# Patient Record
Sex: FEMALE | Race: BLACK OR AFRICAN AMERICAN | NOT HISPANIC OR LATINO | Employment: OTHER | URBAN - METROPOLITAN AREA
[De-identification: names, ages, dates, MRNs, and addresses within clinical notes are randomized per-mention and may not be internally consistent; named-entity substitution may affect disease eponyms.]

---

## 2017-04-11 LAB — CRC RECOMMENDATION EXT: NORMAL

## 2017-12-07 ENCOUNTER — HISTORICAL (OUTPATIENT)
Dept: ADMINISTRATIVE | Facility: HOSPITAL | Age: 72
End: 2017-12-07

## 2017-12-07 LAB
ABS NEUT (OLG): 3.03 X10(3)/MCL (ref 2.1–9.2)
ALBUMIN SERPL-MCNC: 3.7 GM/DL (ref 3.4–5)
ALBUMIN/GLOB SERPL: 1.2 {RATIO}
ALP SERPL-CCNC: 100 UNIT/L (ref 38–126)
ALT SERPL-CCNC: 14 UNIT/L (ref 12–78)
AST SERPL-CCNC: 16 UNIT/L (ref 15–37)
BASOPHILS # BLD AUTO: 0 X10(3)/MCL (ref 0–0.2)
BASOPHILS NFR BLD AUTO: 1 %
BILIRUB SERPL-MCNC: 0.5 MG/DL (ref 0.2–1)
BILIRUBIN DIRECT+TOT PNL SERPL-MCNC: 0.1 MG/DL (ref 0–0.2)
BILIRUBIN DIRECT+TOT PNL SERPL-MCNC: 0.4 MG/DL (ref 0–0.8)
BUN SERPL-MCNC: 20 MG/DL (ref 7–18)
CALCIUM SERPL-MCNC: 8.6 MG/DL (ref 8.5–10.1)
CHLORIDE SERPL-SCNC: 106 MMOL/L (ref 98–107)
CHOLEST SERPL-MCNC: 233 MG/DL (ref 0–200)
CHOLEST/HDLC SERPL: 2.7 {RATIO} (ref 0–4)
CO2 SERPL-SCNC: 28 MMOL/L (ref 21–32)
CREAT SERPL-MCNC: 0.69 MG/DL (ref 0.55–1.02)
EOSINOPHIL # BLD AUTO: 0.1 X10(3)/MCL (ref 0–0.9)
EOSINOPHIL NFR BLD AUTO: 2 %
ERYTHROCYTE [DISTWIDTH] IN BLOOD BY AUTOMATED COUNT: 13 % (ref 11.5–17)
GLOBULIN SER-MCNC: 3 GM/DL (ref 2.4–3.5)
GLUCOSE SERPL-MCNC: 78 MG/DL (ref 74–106)
HCT VFR BLD AUTO: 34.8 % (ref 37–47)
HDLC SERPL-MCNC: 86 MG/DL (ref 35–60)
HGB BLD-MCNC: 10.9 GM/DL (ref 12–16)
LDLC SERPL CALC-MCNC: 138 MG/DL (ref 0–129)
LYMPHOCYTES # BLD AUTO: 2 X10(3)/MCL (ref 0.6–4.6)
LYMPHOCYTES NFR BLD AUTO: 35 %
MCH RBC QN AUTO: 28.8 PG (ref 27–31)
MCHC RBC AUTO-ENTMCNC: 31.3 GM/DL (ref 33–36)
MCV RBC AUTO: 92.1 FL (ref 80–94)
MONOCYTES # BLD AUTO: 0.4 X10(3)/MCL (ref 0.1–1.3)
MONOCYTES NFR BLD AUTO: 7 %
NEUTROPHILS # BLD AUTO: 3.03 X10(3)/MCL (ref 1.4–7.9)
NEUTROPHILS NFR BLD AUTO: 54 %
PLATELET # BLD AUTO: 216 X10(3)/MCL (ref 130–400)
PMV BLD AUTO: 9.6 FL (ref 9.4–12.4)
POTASSIUM SERPL-SCNC: 4.7 MMOL/L (ref 3.5–5.1)
PROT SERPL-MCNC: 6.7 GM/DL (ref 6.4–8.2)
RBC # BLD AUTO: 3.78 X10(6)/MCL (ref 4.2–5.4)
SODIUM SERPL-SCNC: 141 MMOL/L (ref 136–145)
T4 FREE SERPL-MCNC: 1.04 NG/DL (ref 0.76–1.46)
TRIGL SERPL-MCNC: 45 MG/DL (ref 30–150)
TSH SERPL-ACNC: 2.27 MIU/ML (ref 0.36–3.74)
VLDLC SERPL CALC-MCNC: 9 MG/DL
WBC # SPEC AUTO: 5.6 X10(3)/MCL (ref 4.5–11.5)

## 2019-01-04 ENCOUNTER — HISTORICAL (OUTPATIENT)
Dept: ADMINISTRATIVE | Facility: HOSPITAL | Age: 74
End: 2019-01-04

## 2019-01-04 LAB
ABS NEUT (OLG): 6.01 X10(3)/MCL (ref 2.1–9.2)
ALBUMIN SERPL-MCNC: 3.8 GM/DL (ref 3.4–5)
ALP SERPL-CCNC: 87 UNIT/L (ref 38–126)
ALT SERPL-CCNC: 13 UNIT/L (ref 12–78)
AST SERPL-CCNC: 15 UNIT/L (ref 15–37)
BASOPHILS # BLD AUTO: 0 X10(3)/MCL (ref 0–0.2)
BASOPHILS NFR BLD AUTO: 0 %
BILIRUB SERPL-MCNC: 0.5 MG/DL (ref 0.2–1)
BILIRUBIN DIRECT+TOT PNL SERPL-MCNC: 0.1 MG/DL (ref 0–0.5)
BILIRUBIN DIRECT+TOT PNL SERPL-MCNC: 0.4 MG/DL (ref 0–0.8)
BUN SERPL-MCNC: 26 MG/DL (ref 7–18)
CALCIUM SERPL-MCNC: 8.8 MG/DL (ref 8.5–10.1)
CHLORIDE SERPL-SCNC: 103 MMOL/L (ref 98–107)
CHOLEST SERPL-MCNC: 264 MG/DL (ref 0–200)
CHOLEST/HDLC SERPL: 2.5 {RATIO} (ref 0–4)
CO2 SERPL-SCNC: 28 MMOL/L (ref 21–32)
CREAT SERPL-MCNC: 0.82 MG/DL (ref 0.55–1.02)
CREAT/UREA NIT SERPL: 31.7
EOSINOPHIL # BLD AUTO: 0 X10(3)/MCL (ref 0–0.9)
EOSINOPHIL NFR BLD AUTO: 0 %
ERYTHROCYTE [DISTWIDTH] IN BLOOD BY AUTOMATED COUNT: 13.2 % (ref 11.5–17)
GLUCOSE SERPL-MCNC: 79 MG/DL (ref 74–106)
HCT VFR BLD AUTO: 38.3 % (ref 37–47)
HDLC SERPL-MCNC: 106 MG/DL (ref 35–60)
HGB BLD-MCNC: 12 GM/DL (ref 12–16)
LDLC SERPL CALC-MCNC: 143 MG/DL (ref 0–129)
LIVER PROFILE INTERP: NORMAL
LYMPHOCYTES # BLD AUTO: 4.3 X10(3)/MCL (ref 0.6–4.6)
LYMPHOCYTES NFR BLD AUTO: 38 %
MCH RBC QN AUTO: 29 PG (ref 27–31)
MCHC RBC AUTO-ENTMCNC: 31.3 GM/DL (ref 33–36)
MCV RBC AUTO: 92.5 FL (ref 80–94)
MONOCYTES # BLD AUTO: 0.8 X10(3)/MCL (ref 0.1–1.3)
MONOCYTES NFR BLD AUTO: 7 %
NEUTROPHILS # BLD AUTO: 6.01 X10(3)/MCL (ref 2.1–9.2)
NEUTROPHILS NFR BLD AUTO: 53 %
PLATELET # BLD AUTO: 282 X10(3)/MCL (ref 130–400)
PMV BLD AUTO: 9 FL (ref 9.4–12.4)
POTASSIUM SERPL-SCNC: 4 MMOL/L (ref 3.5–5.1)
PROT SERPL-MCNC: 7.3 GM/DL (ref 6.4–8.2)
RBC # BLD AUTO: 4.14 X10(6)/MCL (ref 4.2–5.4)
SODIUM SERPL-SCNC: 139 MMOL/L (ref 136–145)
TRIGL SERPL-MCNC: 73 MG/DL (ref 30–150)
TSH SERPL-ACNC: 3.12 MIU/L (ref 0.36–3.74)
VLDLC SERPL CALC-MCNC: 15 MG/DL
WBC # SPEC AUTO: 11.3 X10(3)/MCL (ref 4.5–11.5)

## 2019-03-19 ENCOUNTER — HOSPITAL ENCOUNTER (OUTPATIENT)
Dept: NUTRITION | Facility: HOSPITAL | Age: 74
End: 2019-03-20
Attending: INTERNAL MEDICINE | Admitting: INTERNAL MEDICINE

## 2019-03-19 LAB
ABS NEUT (OLG): 5.17 X10(3)/MCL (ref 2.1–9.2)
ALBUMIN SERPL-MCNC: 3.5 GM/DL (ref 3.4–5)
ALBUMIN/GLOB SERPL: 1 RATIO (ref 1.1–2)
ALP SERPL-CCNC: 73 UNIT/L (ref 38–126)
ALT SERPL-CCNC: 14 UNIT/L (ref 12–78)
AST SERPL-CCNC: 20 UNIT/L (ref 15–37)
BASOPHILS # BLD AUTO: 0 X10(3)/MCL (ref 0–0.2)
BASOPHILS NFR BLD AUTO: 0 %
BILIRUB SERPL-MCNC: 0.4 MG/DL (ref 0.2–1)
BILIRUBIN DIRECT+TOT PNL SERPL-MCNC: 0.1 MG/DL (ref 0–0.5)
BILIRUBIN DIRECT+TOT PNL SERPL-MCNC: 0.3 MG/DL (ref 0–0.8)
BUN SERPL-MCNC: 21 MG/DL (ref 7–18)
CALCIUM SERPL-MCNC: 8.3 MG/DL (ref 8.5–10.1)
CHLORIDE SERPL-SCNC: 105 MMOL/L (ref 98–107)
CK SERPL-CCNC: 120 UNIT/L (ref 26–192)
CO2 SERPL-SCNC: 29 MMOL/L (ref 21–32)
CREAT SERPL-MCNC: 0.78 MG/DL (ref 0.55–1.02)
EOSINOPHIL # BLD AUTO: 0.3 X10(3)/MCL (ref 0–0.9)
EOSINOPHIL NFR BLD AUTO: 4 %
ERYTHROCYTE [DISTWIDTH] IN BLOOD BY AUTOMATED COUNT: 13.5 % (ref 11.5–17)
GLOBULIN SER-MCNC: 3.5 GM/DL (ref 2.4–3.5)
GLUCOSE SERPL-MCNC: 99 MG/DL (ref 74–106)
HCT VFR BLD AUTO: 35.4 % (ref 37–47)
HGB BLD-MCNC: 10.9 GM/DL (ref 12–16)
LYMPHOCYTES # BLD AUTO: 2.1 X10(3)/MCL (ref 0.6–4.6)
LYMPHOCYTES NFR BLD AUTO: 26 %
MCH RBC QN AUTO: 28.7 PG (ref 27–31)
MCHC RBC AUTO-ENTMCNC: 30.8 GM/DL (ref 33–36)
MCV RBC AUTO: 93.2 FL (ref 80–94)
MONOCYTES # BLD AUTO: 0.6 X10(3)/MCL (ref 0.1–1.3)
MONOCYTES NFR BLD AUTO: 7 %
NEUTROPHILS # BLD AUTO: 5.17 X10(3)/MCL (ref 2.1–9.2)
NEUTROPHILS NFR BLD AUTO: 63 %
PLATELET # BLD AUTO: 223 X10(3)/MCL (ref 130–400)
PMV BLD AUTO: 9.6 FL (ref 9.4–12.4)
POTASSIUM SERPL-SCNC: 4 MMOL/L (ref 3.5–5.1)
PROT SERPL-MCNC: 7 GM/DL (ref 6.4–8.2)
RBC # BLD AUTO: 3.8 X10(6)/MCL (ref 4.2–5.4)
SODIUM SERPL-SCNC: 139 MMOL/L (ref 136–145)
TROPONIN I SERPL-MCNC: 0.21 NG/ML (ref 0.02–0.49)
WBC # SPEC AUTO: 8.2 X10(3)/MCL (ref 4.5–11.5)

## 2019-03-20 LAB
CK MB SERPL-MCNC: 2.4 NG/ML (ref 0.5–3.6)
CK SERPL-CCNC: 119 UNIT/L (ref 26–192)
TROPONIN I SERPL-MCNC: 0.16 NG/ML (ref 0.02–0.49)
TROPONIN I SERPL-MCNC: 0.34 NG/ML (ref 0.02–0.49)

## 2019-04-22 ENCOUNTER — HISTORICAL (OUTPATIENT)
Dept: ADMINISTRATIVE | Facility: HOSPITAL | Age: 74
End: 2019-04-22

## 2019-04-22 LAB
ABS NEUT (OLG): 3.08 X10(3)/MCL (ref 2.1–9.2)
ALBUMIN SERPL-MCNC: 3.9 GM/DL (ref 3.4–5)
ALBUMIN/GLOB SERPL: 1.3 RATIO (ref 1.1–2)
ALP SERPL-CCNC: 78 UNIT/L (ref 38–126)
ALT SERPL-CCNC: 17 UNIT/L (ref 12–78)
APPEARANCE, UA: CLEAR
AST SERPL-CCNC: 20 UNIT/L (ref 15–37)
BACTERIA SPEC CULT: ABNORMAL /HPF
BASOPHILS # BLD AUTO: 0 X10(3)/MCL (ref 0–0.2)
BASOPHILS NFR BLD AUTO: 1 %
BILIRUB SERPL-MCNC: 0.4 MG/DL (ref 0.2–1)
BILIRUB UR QL STRIP: NEGATIVE
BILIRUBIN DIRECT+TOT PNL SERPL-MCNC: 0.1 MG/DL (ref 0–0.5)
BILIRUBIN DIRECT+TOT PNL SERPL-MCNC: 0.3 MG/DL (ref 0–0.8)
BUN SERPL-MCNC: 16 MG/DL (ref 7–18)
CALCIUM SERPL-MCNC: 8.9 MG/DL (ref 8.5–10.1)
CHLORIDE SERPL-SCNC: 105 MMOL/L (ref 98–107)
CHOLEST SERPL-MCNC: 226 MG/DL (ref 0–200)
CHOLEST/HDLC SERPL: 2.7 {RATIO} (ref 0–4)
CO2 SERPL-SCNC: 28 MMOL/L (ref 21–32)
COLOR UR: YELLOW
CREAT SERPL-MCNC: 0.82 MG/DL (ref 0.55–1.02)
EOSINOPHIL # BLD AUTO: 0.2 X10(3)/MCL (ref 0–0.9)
EOSINOPHIL NFR BLD AUTO: 3 %
ERYTHROCYTE [DISTWIDTH] IN BLOOD BY AUTOMATED COUNT: 13.2 % (ref 11.5–17)
GLOBULIN SER-MCNC: 2.9 GM/DL (ref 2.4–3.5)
GLUCOSE (UA): NEGATIVE
GLUCOSE SERPL-MCNC: 86 MG/DL (ref 74–106)
HCT VFR BLD AUTO: 35.2 % (ref 37–47)
HDLC SERPL-MCNC: 85 MG/DL (ref 35–60)
HGB BLD-MCNC: 10.8 GM/DL (ref 12–16)
HGB UR QL STRIP: NEGATIVE
KETONES UR QL STRIP: NEGATIVE
LDLC SERPL CALC-MCNC: 126 MG/DL (ref 0–129)
LEUKOCYTE ESTERASE UR QL STRIP: ABNORMAL
LYMPHOCYTES # BLD AUTO: 2.6 X10(3)/MCL (ref 0.6–4.6)
LYMPHOCYTES NFR BLD AUTO: 40 %
MAGNESIUM SERPL-MCNC: 2.2 MG/DL (ref 1.8–2.4)
MCH RBC QN AUTO: 29.6 PG (ref 27–31)
MCHC RBC AUTO-ENTMCNC: 30.7 GM/DL (ref 33–36)
MCV RBC AUTO: 96.4 FL (ref 80–94)
MONOCYTES # BLD AUTO: 0.5 X10(3)/MCL (ref 0.1–1.3)
MONOCYTES NFR BLD AUTO: 8 %
NEUTROPHILS # BLD AUTO: 3.08 X10(3)/MCL (ref 2.1–9.2)
NEUTROPHILS NFR BLD AUTO: 48 %
NITRITE UR QL STRIP: NEGATIVE
PH UR STRIP: 7 [PH] (ref 5–9)
PLATELET # BLD AUTO: 235 X10(3)/MCL (ref 130–400)
PMV BLD AUTO: 9.5 FL (ref 9.4–12.4)
POTASSIUM SERPL-SCNC: 4.3 MMOL/L (ref 3.5–5.1)
PROT SERPL-MCNC: 6.8 GM/DL (ref 6.4–8.2)
PROT UR QL STRIP: NEGATIVE
RBC # BLD AUTO: 3.65 X10(6)/MCL (ref 4.2–5.4)
RBC #/AREA URNS HPF: ABNORMAL /[HPF]
SODIUM SERPL-SCNC: 140 MMOL/L (ref 136–145)
SP GR UR STRIP: 1.01 (ref 1–1.03)
SQUAMOUS EPITHELIAL, UA: ABNORMAL
TRIGL SERPL-MCNC: 75 MG/DL (ref 30–150)
UROBILINOGEN UR STRIP-ACNC: 0.2
VLDLC SERPL CALC-MCNC: 15 MG/DL
WBC # SPEC AUTO: 6.4 X10(3)/MCL (ref 4.5–11.5)
WBC #/AREA URNS HPF: ABNORMAL /[HPF]

## 2019-07-24 ENCOUNTER — HISTORICAL (OUTPATIENT)
Dept: ADMINISTRATIVE | Facility: HOSPITAL | Age: 74
End: 2019-07-24

## 2019-08-05 ENCOUNTER — HISTORICAL (OUTPATIENT)
Dept: ADMINISTRATIVE | Facility: HOSPITAL | Age: 74
End: 2019-08-05

## 2019-11-13 ENCOUNTER — HISTORICAL (OUTPATIENT)
Dept: ADMINISTRATIVE | Facility: HOSPITAL | Age: 74
End: 2019-11-13

## 2019-11-19 ENCOUNTER — HISTORICAL (OUTPATIENT)
Dept: RADIOLOGY | Facility: HOSPITAL | Age: 74
End: 2019-11-19

## 2020-01-29 ENCOUNTER — HISTORICAL (OUTPATIENT)
Dept: ADMINISTRATIVE | Facility: HOSPITAL | Age: 75
End: 2020-01-29

## 2020-01-29 LAB
ABS NEUT (OLG): 3.56 X10(3)/MCL (ref 2.1–9.2)
ALBUMIN SERPL-MCNC: 3.8 GM/DL (ref 3.4–5)
ALBUMIN/GLOB SERPL: 1.3 {RATIO}
ALP SERPL-CCNC: 73 UNIT/L (ref 38–126)
ALT SERPL-CCNC: 19 UNIT/L (ref 12–78)
AST SERPL-CCNC: 27 UNIT/L (ref 15–37)
BASOPHILS # BLD AUTO: 0 X10(3)/MCL (ref 0–0.2)
BASOPHILS NFR BLD AUTO: 1 %
BILIRUB SERPL-MCNC: 0.4 MG/DL (ref 0.2–1)
BILIRUBIN DIRECT+TOT PNL SERPL-MCNC: 0.1 MG/DL (ref 0–0.2)
BILIRUBIN DIRECT+TOT PNL SERPL-MCNC: 0.3 MG/DL (ref 0–0.8)
BUN SERPL-MCNC: 23 MG/DL (ref 7–18)
CALCIUM SERPL-MCNC: 8.8 MG/DL (ref 8.5–10.1)
CHLORIDE SERPL-SCNC: 107 MMOL/L (ref 98–107)
CHOLEST SERPL-MCNC: 251 MG/DL (ref 0–200)
CHOLEST/HDLC SERPL: 2.5 {RATIO} (ref 0–4)
CK SERPL-CCNC: 177 UNIT/L (ref 26–192)
CO2 SERPL-SCNC: 29 MMOL/L (ref 21–32)
CREAT SERPL-MCNC: 0.78 MG/DL (ref 0.55–1.02)
EOSINOPHIL # BLD AUTO: 0.2 X10(3)/MCL (ref 0–0.9)
EOSINOPHIL NFR BLD AUTO: 4 %
ERYTHROCYTE [DISTWIDTH] IN BLOOD BY AUTOMATED COUNT: 13.9 % (ref 11.5–17)
GLOBULIN SER-MCNC: 2.9 GM/DL (ref 2.4–3.5)
GLUCOSE SERPL-MCNC: 88 MG/DL (ref 74–106)
HCT VFR BLD AUTO: 35.3 % (ref 37–47)
HDLC SERPL-MCNC: 99 MG/DL (ref 35–60)
HGB BLD-MCNC: 10.7 GM/DL (ref 12–16)
LDLC SERPL CALC-MCNC: 140 MG/DL (ref 0–129)
LYMPHOCYTES # BLD AUTO: 2.2 X10(3)/MCL (ref 0.6–4.6)
LYMPHOCYTES NFR BLD AUTO: 33 %
MCH RBC QN AUTO: 28.7 PG (ref 27–31)
MCHC RBC AUTO-ENTMCNC: 30.3 GM/DL (ref 33–36)
MCV RBC AUTO: 94.6 FL (ref 80–94)
MONOCYTES # BLD AUTO: 0.6 X10(3)/MCL (ref 0.1–1.3)
MONOCYTES NFR BLD AUTO: 8 %
NEUTROPHILS # BLD AUTO: 3.56 X10(3)/MCL (ref 2.1–9.2)
NEUTROPHILS NFR BLD AUTO: 53 %
PLATELET # BLD AUTO: 229 X10(3)/MCL (ref 130–400)
PMV BLD AUTO: 9.6 FL (ref 9.4–12.4)
POTASSIUM SERPL-SCNC: 4.4 MMOL/L (ref 3.5–5.1)
PROT SERPL-MCNC: 6.7 GM/DL (ref 6.4–8.2)
RBC # BLD AUTO: 3.73 X10(6)/MCL (ref 4.2–5.4)
SODIUM SERPL-SCNC: 141 MMOL/L (ref 136–145)
TRIGL SERPL-MCNC: 61 MG/DL (ref 30–150)
TSH SERPL-ACNC: 2.8 MIU/L (ref 0.36–3.74)
VLDLC SERPL CALC-MCNC: 12 MG/DL
WBC # SPEC AUTO: 6.7 X10(3)/MCL (ref 4.5–11.5)

## 2020-06-23 ENCOUNTER — HISTORICAL (OUTPATIENT)
Dept: ADMINISTRATIVE | Facility: HOSPITAL | Age: 75
End: 2020-06-23

## 2020-06-23 LAB
ABS NEUT (OLG): 3.36 X10(3)/MCL (ref 2.1–9.2)
ALBUMIN SERPL-MCNC: 3.9 GM/DL (ref 3.4–5)
ALBUMIN/GLOB SERPL: 1.5 RATIO (ref 1.1–2)
ALP SERPL-CCNC: 64 UNIT/L (ref 40–150)
ALT SERPL-CCNC: 6 UNIT/L (ref 0–55)
APPEARANCE, UA: CLEAR
AST SERPL-CCNC: 18 UNIT/L (ref 5–34)
BACTERIA SPEC CULT: ABNORMAL /HPF
BASOPHILS # BLD AUTO: 0 X10(3)/MCL (ref 0–0.2)
BASOPHILS NFR BLD AUTO: 1 %
BILIRUB SERPL-MCNC: 0.7 MG/DL
BILIRUB UR QL STRIP: NEGATIVE
BILIRUBIN DIRECT+TOT PNL SERPL-MCNC: 0.2 MG/DL (ref 0–0.5)
BILIRUBIN DIRECT+TOT PNL SERPL-MCNC: 0.5 MG/DL (ref 0–0.8)
BUN SERPL-MCNC: 17.1 MG/DL (ref 9.8–20.1)
CALCIUM SERPL-MCNC: 8.6 MG/DL (ref 8.4–10.2)
CHLORIDE SERPL-SCNC: 107 MMOL/L (ref 98–107)
CHOLEST SERPL-MCNC: 223 MG/DL
CHOLEST/HDLC SERPL: 3 {RATIO} (ref 0–5)
CO2 SERPL-SCNC: 28 MMOL/L (ref 23–31)
COLOR UR: YELLOW
CREAT SERPL-MCNC: 0.77 MG/DL (ref 0.55–1.02)
EOSINOPHIL # BLD AUTO: 0.2 X10(3)/MCL (ref 0–0.9)
EOSINOPHIL NFR BLD AUTO: 2 %
ERYTHROCYTE [DISTWIDTH] IN BLOOD BY AUTOMATED COUNT: 13.4 % (ref 11.5–17)
GLOBULIN SER-MCNC: 2.6 GM/DL (ref 2.4–3.5)
GLUCOSE (UA): NEGATIVE
GLUCOSE SERPL-MCNC: 87 MG/DL (ref 82–115)
HCT VFR BLD AUTO: 34.8 % (ref 37–47)
HDLC SERPL-MCNC: 73 MG/DL (ref 35–60)
HGB BLD-MCNC: 10.8 GM/DL (ref 12–16)
HGB UR QL STRIP: NEGATIVE
KETONES UR QL STRIP: NEGATIVE
LDLC SERPL CALC-MCNC: 141 MG/DL (ref 50–140)
LEUKOCYTE ESTERASE UR QL STRIP: ABNORMAL
LYMPHOCYTES # BLD AUTO: 2.1 X10(3)/MCL (ref 0.6–4.6)
LYMPHOCYTES NFR BLD AUTO: 34 %
MCH RBC QN AUTO: 29.6 PG (ref 27–31)
MCHC RBC AUTO-ENTMCNC: 31 GM/DL (ref 33–36)
MCV RBC AUTO: 95.3 FL (ref 80–94)
MONOCYTES # BLD AUTO: 0.4 X10(3)/MCL (ref 0.1–1.3)
MONOCYTES NFR BLD AUTO: 7 %
NEUTROPHILS # BLD AUTO: 3.36 X10(3)/MCL (ref 2.1–9.2)
NEUTROPHILS NFR BLD AUTO: 55 %
NITRITE UR QL STRIP: NEGATIVE
PH UR STRIP: 7 [PH] (ref 5–9)
PLATELET # BLD AUTO: 223 X10(3)/MCL (ref 130–400)
PMV BLD AUTO: 9.4 FL (ref 9.4–12.4)
POTASSIUM SERPL-SCNC: 4.8 MMOL/L (ref 3.5–5.1)
PROT SERPL-MCNC: 6.5 GM/DL (ref 5.8–7.6)
PROT UR QL STRIP: NEGATIVE
RBC # BLD AUTO: 3.65 X10(6)/MCL (ref 4.2–5.4)
RBC #/AREA URNS HPF: ABNORMAL /[HPF]
SODIUM SERPL-SCNC: 142 MMOL/L (ref 136–145)
SP GR UR STRIP: 1.01 (ref 1–1.03)
SQUAMOUS EPITHELIAL, UA: ABNORMAL
TRIGL SERPL-MCNC: 43 MG/DL (ref 37–140)
UROBILINOGEN UR STRIP-ACNC: 0.2
VLDLC SERPL CALC-MCNC: 9 MG/DL
WBC # SPEC AUTO: 6.1 X10(3)/MCL (ref 4.5–11.5)
WBC #/AREA URNS HPF: ABNORMAL /[HPF]

## 2020-09-29 LAB — BMD RECOMMENDATION EXT: NORMAL

## 2020-10-12 ENCOUNTER — HISTORICAL (OUTPATIENT)
Dept: ADMINISTRATIVE | Facility: HOSPITAL | Age: 75
End: 2020-10-12

## 2020-10-12 LAB
ABS NEUT (OLG): 2.9 X10(3)/MCL (ref 2.1–9.2)
ALBUMIN SERPL-MCNC: 4.2 GM/DL (ref 3.4–4.8)
ALBUMIN/GLOB SERPL: 1.8 RATIO (ref 1.1–2)
ALP SERPL-CCNC: 57 UNIT/L (ref 40–150)
ALT SERPL-CCNC: 9 UNIT/L (ref 0–55)
APPEARANCE, UA: CLEAR
AST SERPL-CCNC: 22 UNIT/L (ref 5–34)
BACTERIA SPEC CULT: ABNORMAL /HPF
BASOPHILS # BLD AUTO: 0 X10(3)/MCL (ref 0–0.2)
BASOPHILS NFR BLD AUTO: 1 %
BILIRUB SERPL-MCNC: 0.5 MG/DL
BILIRUB UR QL STRIP: NEGATIVE
BILIRUBIN DIRECT+TOT PNL SERPL-MCNC: 0.2 MG/DL (ref 0–0.5)
BILIRUBIN DIRECT+TOT PNL SERPL-MCNC: 0.3 MG/DL (ref 0–0.8)
BUN SERPL-MCNC: 16.5 MG/DL (ref 9.8–20.1)
CALCIUM SERPL-MCNC: 8.8 MG/DL (ref 8.4–10.2)
CHLORIDE SERPL-SCNC: 105 MMOL/L (ref 98–107)
CHOLEST SERPL-MCNC: 144 MG/DL
CHOLEST/HDLC SERPL: 2 {RATIO} (ref 0–5)
CO2 SERPL-SCNC: 28 MMOL/L (ref 23–31)
COLOR UR: YELLOW
CREAT SERPL-MCNC: 0.81 MG/DL (ref 0.55–1.02)
EOSINOPHIL # BLD AUTO: 0.1 X10(3)/MCL (ref 0–0.9)
EOSINOPHIL NFR BLD AUTO: 2 %
ERYTHROCYTE [DISTWIDTH] IN BLOOD BY AUTOMATED COUNT: 12.8 % (ref 11.5–17)
GLOBULIN SER-MCNC: 2.3 GM/DL (ref 2.4–3.5)
GLUCOSE (UA): NEGATIVE
GLUCOSE SERPL-MCNC: 85 MG/DL (ref 82–115)
HCT VFR BLD AUTO: 33.5 % (ref 37–47)
HDLC SERPL-MCNC: 73 MG/DL (ref 35–60)
HGB BLD-MCNC: 10.6 GM/DL (ref 12–16)
HGB UR QL STRIP: NEGATIVE
KETONES UR QL STRIP: NEGATIVE
LDLC SERPL CALC-MCNC: 65 MG/DL (ref 50–140)
LEUKOCYTE ESTERASE UR QL STRIP: ABNORMAL
LYMPHOCYTES # BLD AUTO: 2.2 X10(3)/MCL (ref 0.6–4.6)
LYMPHOCYTES NFR BLD AUTO: 39 %
MCH RBC QN AUTO: 29.2 PG (ref 27–31)
MCHC RBC AUTO-ENTMCNC: 31.6 GM/DL (ref 33–36)
MCV RBC AUTO: 92.3 FL (ref 80–94)
MONOCYTES # BLD AUTO: 0.4 X10(3)/MCL (ref 0.1–1.3)
MONOCYTES NFR BLD AUTO: 7 %
NEUTROPHILS # BLD AUTO: 2.9 X10(3)/MCL (ref 2.1–9.2)
NEUTROPHILS NFR BLD AUTO: 51 %
NITRITE UR QL STRIP: NEGATIVE
PH UR STRIP: 6.5 [PH] (ref 5–9)
PLATELET # BLD AUTO: 200 X10(3)/MCL (ref 130–400)
PMV BLD AUTO: 10.4 FL (ref 9.4–12.4)
POTASSIUM SERPL-SCNC: 4.9 MMOL/L (ref 3.5–5.1)
PROT SERPL-MCNC: 6.5 GM/DL (ref 5.8–7.6)
PROT UR QL STRIP: NEGATIVE
RBC # BLD AUTO: 3.63 X10(6)/MCL (ref 4.2–5.4)
RBC #/AREA URNS HPF: ABNORMAL /[HPF]
SODIUM SERPL-SCNC: 141 MMOL/L (ref 136–145)
SP GR UR STRIP: 1.01 (ref 1–1.03)
SQUAMOUS EPITHELIAL, UA: ABNORMAL
TRIGL SERPL-MCNC: 28 MG/DL (ref 37–140)
UROBILINOGEN UR STRIP-ACNC: 0.2
VLDLC SERPL CALC-MCNC: 6 MG/DL
WBC # SPEC AUTO: 5.7 X10(3)/MCL (ref 4.5–11.5)
WBC #/AREA URNS HPF: 1 /HPF (ref 0–3)

## 2021-01-06 ENCOUNTER — HISTORICAL (OUTPATIENT)
Dept: ADMINISTRATIVE | Facility: HOSPITAL | Age: 76
End: 2021-01-06

## 2021-01-06 LAB
APPEARANCE, UA: CLEAR
BACTERIA SPEC CULT: NORMAL /HPF
BILIRUB UR QL STRIP: NEGATIVE
COLOR UR: YELLOW
GLUCOSE (UA): NEGATIVE
HGB UR QL STRIP: NEGATIVE
KETONES UR QL STRIP: NEGATIVE
LEUKOCYTE ESTERASE UR QL STRIP: NEGATIVE
NITRITE UR QL STRIP: NEGATIVE
PH UR STRIP: 7.5 [PH] (ref 5–9)
PROT UR QL STRIP: NEGATIVE
RBC #/AREA URNS HPF: NORMAL /[HPF]
SP GR UR STRIP: 1.02 (ref 1–1.03)
SQUAMOUS EPITHELIAL, UA: NORMAL
UROBILINOGEN UR STRIP-ACNC: 0.2
WBC #/AREA URNS HPF: NORMAL /[HPF]

## 2021-01-07 ENCOUNTER — HISTORICAL (OUTPATIENT)
Dept: ADMINISTRATIVE | Facility: HOSPITAL | Age: 76
End: 2021-01-07

## 2021-01-07 LAB
ABS NEUT (OLG): 3.5 X10(3)/MCL (ref 2.1–9.2)
ALBUMIN SERPL-MCNC: 4.2 GM/DL (ref 3.4–4.8)
ALBUMIN/GLOB SERPL: 1.7 RATIO (ref 1.1–2)
ALP SERPL-CCNC: 63 UNIT/L (ref 40–150)
ALT SERPL-CCNC: 10 UNIT/L (ref 0–55)
AST SERPL-CCNC: 24 UNIT/L (ref 5–34)
BASOPHILS # BLD AUTO: 0 X10(3)/MCL (ref 0–0.2)
BASOPHILS NFR BLD AUTO: 1 %
BILIRUB SERPL-MCNC: 0.7 MG/DL
BILIRUBIN DIRECT+TOT PNL SERPL-MCNC: 0.3 MG/DL (ref 0–0.5)
BILIRUBIN DIRECT+TOT PNL SERPL-MCNC: 0.4 MG/DL (ref 0–0.8)
BUN SERPL-MCNC: 16 MG/DL (ref 9.8–20.1)
CALCIUM SERPL-MCNC: 8.3 MG/DL (ref 8.4–10.2)
CHLORIDE SERPL-SCNC: 103 MMOL/L (ref 98–107)
CHOLEST SERPL-MCNC: 153 MG/DL
CHOLEST/HDLC SERPL: 2 {RATIO} (ref 0–5)
CO2 SERPL-SCNC: 27 MMOL/L (ref 23–31)
CREAT SERPL-MCNC: 0.71 MG/DL (ref 0.55–1.02)
EOSINOPHIL # BLD AUTO: 0.2 X10(3)/MCL (ref 0–0.9)
EOSINOPHIL NFR BLD AUTO: 3 %
ERYTHROCYTE [DISTWIDTH] IN BLOOD BY AUTOMATED COUNT: 13.2 % (ref 11.5–17)
GLOBULIN SER-MCNC: 2.5 GM/DL (ref 2.4–3.5)
GLUCOSE SERPL-MCNC: 76 MG/DL (ref 82–115)
HCT VFR BLD AUTO: 33.3 % (ref 37–47)
HDLC SERPL-MCNC: 78 MG/DL (ref 35–60)
HGB BLD-MCNC: 10.6 GM/DL (ref 12–16)
LDLC SERPL CALC-MCNC: 67 MG/DL (ref 50–140)
LYMPHOCYTES # BLD AUTO: 2 X10(3)/MCL (ref 0.6–4.6)
LYMPHOCYTES NFR BLD AUTO: 32 %
MCH RBC QN AUTO: 29.9 PG (ref 27–31)
MCHC RBC AUTO-ENTMCNC: 31.8 GM/DL (ref 33–36)
MCV RBC AUTO: 93.8 FL (ref 80–94)
MONOCYTES # BLD AUTO: 0.4 X10(3)/MCL (ref 0.1–1.3)
MONOCYTES NFR BLD AUTO: 6 %
NEUTROPHILS # BLD AUTO: 3.5 X10(3)/MCL (ref 2.1–9.2)
NEUTROPHILS NFR BLD AUTO: 58 %
PLATELET # BLD AUTO: 208 X10(3)/MCL (ref 130–400)
PMV BLD AUTO: 10.5 FL (ref 9.4–12.4)
POTASSIUM SERPL-SCNC: 4.1 MMOL/L (ref 3.5–5.1)
PROT SERPL-MCNC: 6.7 GM/DL (ref 5.8–7.6)
RBC # BLD AUTO: 3.55 X10(6)/MCL (ref 4.2–5.4)
SODIUM SERPL-SCNC: 143 MMOL/L (ref 136–145)
TRIGL SERPL-MCNC: 38 MG/DL (ref 37–140)
VLDLC SERPL CALC-MCNC: 8 MG/DL
WBC # SPEC AUTO: 6.1 X10(3)/MCL (ref 4.5–11.5)

## 2021-01-27 ENCOUNTER — HISTORICAL (OUTPATIENT)
Dept: ADMINISTRATIVE | Facility: HOSPITAL | Age: 76
End: 2021-01-27

## 2021-03-08 ENCOUNTER — HISTORICAL (OUTPATIENT)
Dept: ADMINISTRATIVE | Facility: HOSPITAL | Age: 76
End: 2021-03-08

## 2021-03-08 LAB
ABS NEUT (OLG): 2.78 X10(3)/MCL (ref 2.1–9.2)
ALBUMIN SERPL-MCNC: 4.1 GM/DL (ref 3.4–4.8)
ALBUMIN/GLOB SERPL: 1.5 RATIO (ref 1.1–2)
ALP SERPL-CCNC: 58 UNIT/L (ref 40–150)
ALT SERPL-CCNC: 8 UNIT/L (ref 0–55)
AST SERPL-CCNC: 20 UNIT/L (ref 5–34)
BASOPHILS # BLD AUTO: 0 X10(3)/MCL (ref 0–0.2)
BASOPHILS NFR BLD AUTO: 1 %
BILIRUB SERPL-MCNC: 0.5 MG/DL
BILIRUBIN DIRECT+TOT PNL SERPL-MCNC: 0.2 MG/DL (ref 0–0.8)
BILIRUBIN DIRECT+TOT PNL SERPL-MCNC: 0.3 MG/DL (ref 0–0.5)
BUN SERPL-MCNC: 18.8 MG/DL (ref 9.8–20.1)
CALCIUM SERPL-MCNC: 8.6 MG/DL (ref 8.4–10.2)
CHLORIDE SERPL-SCNC: 104 MMOL/L (ref 98–107)
CO2 SERPL-SCNC: 27 MMOL/L (ref 23–31)
CREAT SERPL-MCNC: 0.72 MG/DL (ref 0.55–1.02)
EOSINOPHIL # BLD AUTO: 0.1 X10(3)/MCL (ref 0–0.9)
EOSINOPHIL NFR BLD AUTO: 2 %
ERYTHROCYTE [DISTWIDTH] IN BLOOD BY AUTOMATED COUNT: 13.2 % (ref 11.5–17)
GLOBULIN SER-MCNC: 2.7 GM/DL (ref 2.4–3.5)
GLUCOSE SERPL-MCNC: 75 MG/DL (ref 82–115)
HCT VFR BLD AUTO: 36.7 % (ref 37–47)
HGB BLD-MCNC: 11.4 GM/DL (ref 12–16)
LYMPHOCYTES # BLD AUTO: 1.9 X10(3)/MCL (ref 0.6–4.6)
LYMPHOCYTES NFR BLD AUTO: 36 %
MCH RBC QN AUTO: 29.9 PG (ref 27–31)
MCHC RBC AUTO-ENTMCNC: 31.1 GM/DL (ref 33–36)
MCV RBC AUTO: 96.3 FL (ref 80–94)
MONOCYTES # BLD AUTO: 0.4 X10(3)/MCL (ref 0.1–1.3)
MONOCYTES NFR BLD AUTO: 7 %
NEUTROPHILS # BLD AUTO: 2.78 X10(3)/MCL (ref 2.1–9.2)
NEUTROPHILS NFR BLD AUTO: 53 %
PLATELET # BLD AUTO: 193 X10(3)/MCL (ref 130–400)
PMV BLD AUTO: 10.6 FL (ref 9.4–12.4)
POTASSIUM SERPL-SCNC: 4.1 MMOL/L (ref 3.5–5.1)
PROT SERPL-MCNC: 6.8 GM/DL (ref 5.8–7.6)
RBC # BLD AUTO: 3.81 X10(6)/MCL (ref 4.2–5.4)
SODIUM SERPL-SCNC: 138 MMOL/L (ref 136–145)
WBC # SPEC AUTO: 5.2 X10(3)/MCL (ref 4.5–11.5)

## 2021-03-31 ENCOUNTER — HISTORICAL (OUTPATIENT)
Dept: ADMINISTRATIVE | Facility: HOSPITAL | Age: 76
End: 2021-03-31

## 2021-04-06 ENCOUNTER — HISTORICAL (OUTPATIENT)
Dept: RADIOLOGY | Facility: HOSPITAL | Age: 76
End: 2021-04-06

## 2021-06-24 LAB
PAP RECOMMENDATION EXT: NORMAL
PAP SMEAR: NORMAL

## 2021-08-11 ENCOUNTER — HISTORICAL (OUTPATIENT)
Dept: ADMINISTRATIVE | Facility: HOSPITAL | Age: 76
End: 2021-08-11

## 2021-08-11 LAB
ABS NEUT (OLG): 2.92 X10(3)/MCL (ref 2.1–9.2)
ALBUMIN SERPL-MCNC: 4 GM/DL (ref 3.4–4.8)
ALBUMIN/GLOB SERPL: 1.6 RATIO (ref 1.1–2)
ALP SERPL-CCNC: 52 UNIT/L (ref 40–150)
ALT SERPL-CCNC: 8 UNIT/L (ref 0–55)
APPEARANCE, UA: CLEAR
AST SERPL-CCNC: 23 UNIT/L (ref 5–34)
BACTERIA SPEC CULT: ABNORMAL /HPF
BASOPHILS # BLD AUTO: 0 X10(3)/MCL (ref 0–0.2)
BASOPHILS NFR BLD AUTO: 1 %
BILIRUB SERPL-MCNC: 0.7 MG/DL
BILIRUB UR QL STRIP: NEGATIVE
BILIRUBIN DIRECT+TOT PNL SERPL-MCNC: 0.3 MG/DL (ref 0–0.5)
BILIRUBIN DIRECT+TOT PNL SERPL-MCNC: 0.4 MG/DL (ref 0–0.8)
BUN SERPL-MCNC: 20.8 MG/DL (ref 9.8–20.1)
CALCIUM SERPL-MCNC: 9 MG/DL (ref 8.4–10.2)
CHLORIDE SERPL-SCNC: 106 MMOL/L (ref 98–107)
CHOLEST SERPL-MCNC: 171 MG/DL
CHOLEST/HDLC SERPL: 2 {RATIO} (ref 0–5)
CO2 SERPL-SCNC: 26 MMOL/L (ref 23–31)
COLOR UR: YELLOW
CREAT SERPL-MCNC: 0.72 MG/DL (ref 0.55–1.02)
EOSINOPHIL # BLD AUTO: 0.1 X10(3)/MCL (ref 0–0.9)
EOSINOPHIL NFR BLD AUTO: 2 %
ERYTHROCYTE [DISTWIDTH] IN BLOOD BY AUTOMATED COUNT: 13.5 % (ref 11.5–17)
GLOBULIN SER-MCNC: 2.5 GM/DL (ref 2.4–3.5)
GLUCOSE (UA): NEGATIVE
GLUCOSE SERPL-MCNC: 76 MG/DL (ref 82–115)
HCT VFR BLD AUTO: 31.5 % (ref 37–47)
HDLC SERPL-MCNC: 77 MG/DL (ref 35–60)
HGB BLD-MCNC: 10.3 GM/DL (ref 12–16)
HGB UR QL STRIP: NEGATIVE
KETONES UR QL STRIP: NEGATIVE
LDLC SERPL CALC-MCNC: 84 MG/DL (ref 50–140)
LEUKOCYTE ESTERASE UR QL STRIP: ABNORMAL
LYMPHOCYTES # BLD AUTO: 2.2 X10(3)/MCL (ref 0.6–4.6)
LYMPHOCYTES NFR BLD AUTO: 39 %
MCH RBC QN AUTO: 30 PG (ref 27–31)
MCHC RBC AUTO-ENTMCNC: 32.7 GM/DL (ref 33–36)
MCV RBC AUTO: 91.8 FL (ref 80–94)
MONOCYTES # BLD AUTO: 0.4 X10(3)/MCL (ref 0.1–1.3)
MONOCYTES NFR BLD AUTO: 7 %
NEUTROPHILS # BLD AUTO: 2.92 X10(3)/MCL (ref 2.1–9.2)
NEUTROPHILS NFR BLD AUTO: 52 %
NITRITE UR QL STRIP: NEGATIVE
PH UR STRIP: 7.5 [PH] (ref 5–9)
PLATELET # BLD AUTO: 209 X10(3)/MCL (ref 130–400)
PMV BLD AUTO: 10.6 FL (ref 9.4–12.4)
POTASSIUM SERPL-SCNC: 4.4 MMOL/L (ref 3.5–5.1)
PROT SERPL-MCNC: 6.5 GM/DL (ref 5.8–7.6)
PROT UR QL STRIP: NEGATIVE
RBC # BLD AUTO: 3.43 X10(6)/MCL (ref 4.2–5.4)
RBC #/AREA URNS HPF: ABNORMAL /[HPF]
SODIUM SERPL-SCNC: 143 MMOL/L (ref 136–145)
SP GR UR STRIP: 1.01 (ref 1–1.03)
SQUAMOUS EPITHELIAL, UA: ABNORMAL /HPF (ref 0–4)
TRIGL SERPL-MCNC: 52 MG/DL (ref 37–140)
TSH SERPL-ACNC: 1.62 UIU/ML (ref 0.35–4.94)
UROBILINOGEN UR STRIP-ACNC: 0.2
VLDLC SERPL CALC-MCNC: 10 MG/DL
WBC # SPEC AUTO: 5.6 X10(3)/MCL (ref 4.5–11.5)
WBC #/AREA URNS HPF: 5 /HPF (ref 0–3)

## 2021-09-23 ENCOUNTER — HISTORICAL (OUTPATIENT)
Dept: ADMINISTRATIVE | Facility: HOSPITAL | Age: 76
End: 2021-09-23

## 2021-09-23 LAB
ABS NEUT (OLG): 3.05 X10(3)/MCL (ref 2.1–9.2)
ALBUMIN SERPL-MCNC: 3.8 GM/DL (ref 3.4–4.8)
ALBUMIN/GLOB SERPL: 1.7 RATIO (ref 1.1–2)
ALP SERPL-CCNC: 51 UNIT/L (ref 40–150)
ALT SERPL-CCNC: 6 UNIT/L (ref 0–55)
AST SERPL-CCNC: 19 UNIT/L (ref 5–34)
BASOPHILS # BLD AUTO: 0 X10(3)/MCL (ref 0–0.2)
BASOPHILS NFR BLD AUTO: 1 %
BILIRUB SERPL-MCNC: 0.3 MG/DL
BILIRUBIN DIRECT+TOT PNL SERPL-MCNC: 0.1 MG/DL (ref 0–0.8)
BILIRUBIN DIRECT+TOT PNL SERPL-MCNC: 0.2 MG/DL (ref 0–0.5)
BUN SERPL-MCNC: 21 MG/DL (ref 9.8–20.1)
CALCIUM SERPL-MCNC: 9.1 MG/DL (ref 8.4–10.2)
CHLORIDE SERPL-SCNC: 107 MMOL/L (ref 98–107)
CK SERPL-CCNC: 87 U/L (ref 29–168)
CO2 SERPL-SCNC: 27 MMOL/L (ref 23–31)
CREAT SERPL-MCNC: 0.93 MG/DL (ref 0.55–1.02)
EOSINOPHIL # BLD AUTO: 0.1 X10(3)/MCL (ref 0–0.9)
EOSINOPHIL NFR BLD AUTO: 2 %
ERYTHROCYTE [DISTWIDTH] IN BLOOD BY AUTOMATED COUNT: 13.2 % (ref 11.5–17)
GLOBULIN SER-MCNC: 2.3 GM/DL (ref 2.4–3.5)
GLUCOSE SERPL-MCNC: 103 MG/DL (ref 82–115)
HCT VFR BLD AUTO: 31.7 % (ref 37–47)
HGB BLD-MCNC: 10.3 GM/DL (ref 12–16)
LYMPHOCYTES # BLD AUTO: 1.9 X10(3)/MCL (ref 0.6–4.6)
LYMPHOCYTES NFR BLD AUTO: 35 %
MCH RBC QN AUTO: 30.5 PG (ref 27–31)
MCHC RBC AUTO-ENTMCNC: 32.5 GM/DL (ref 33–36)
MCV RBC AUTO: 93.8 FL (ref 80–94)
MONOCYTES # BLD AUTO: 0.4 X10(3)/MCL (ref 0.1–1.3)
MONOCYTES NFR BLD AUTO: 8 %
NEUTROPHILS # BLD AUTO: 3.05 X10(3)/MCL (ref 2.1–9.2)
NEUTROPHILS NFR BLD AUTO: 54 %
PLATELET # BLD AUTO: 191 X10(3)/MCL (ref 130–400)
PMV BLD AUTO: 10.6 FL (ref 9.4–12.4)
POTASSIUM SERPL-SCNC: 4.4 MMOL/L (ref 3.5–5.1)
PROT SERPL-MCNC: 6.1 GM/DL (ref 5.8–7.6)
RBC # BLD AUTO: 3.38 X10(6)/MCL (ref 4.2–5.4)
SODIUM SERPL-SCNC: 142 MMOL/L (ref 136–145)
WBC # SPEC AUTO: 5.6 X10(3)/MCL (ref 4.5–11.5)

## 2022-02-16 ENCOUNTER — HISTORICAL (OUTPATIENT)
Dept: ADMINISTRATIVE | Facility: HOSPITAL | Age: 77
End: 2022-02-16

## 2022-02-16 LAB
ABS NEUT (OLG): 3.16 (ref 2.1–9.2)
ALBUMIN SERPL-MCNC: 3.8 G/DL (ref 3.4–4.8)
ALBUMIN/GLOB SERPL: 1.4 {RATIO} (ref 1.1–2)
ALP SERPL-CCNC: 58 U/L (ref 40–150)
ALT SERPL-CCNC: 9 U/L (ref 0–55)
APPEARANCE, UA: CLEAR
AST SERPL-CCNC: 23 U/L (ref 5–34)
BACTERIA SPEC CULT: NORMAL
BASOPHILS # BLD AUTO: 0 10*3/UL (ref 0–0.2)
BASOPHILS NFR BLD AUTO: 1 %
BILIRUB SERPL-MCNC: 0.5 MG/DL
BILIRUB UR QL STRIP: NEGATIVE
BILIRUBIN DIRECT+TOT PNL SERPL-MCNC: 0.2 (ref 0–0.5)
BILIRUBIN DIRECT+TOT PNL SERPL-MCNC: 0.3 (ref 0–0.8)
BUDDING YEAST: NORMAL
BUN SERPL-MCNC: 14.5 MG/DL (ref 9.8–20.1)
CALCIUM SERPL-MCNC: 9.2 MG/DL (ref 8.7–10.5)
CASTS, UA: NORMAL
CHLORIDE SERPL-SCNC: 104 MMOL/L (ref 98–107)
CHOLEST SERPL-MCNC: 171 MG/DL
CHOLEST/HDLC SERPL: 2 {RATIO} (ref 0–5)
CO2 SERPL-SCNC: 30 MMOL/L (ref 23–31)
COLOR UR: YELLOW
CREAT SERPL-MCNC: 0.75 MG/DL (ref 0.55–1.02)
CRYSTALS: NORMAL
EOSINOPHIL # BLD AUTO: 0.2 10*3/UL (ref 0–0.9)
EOSINOPHIL NFR BLD AUTO: 3 %
ERYTHROCYTE [DISTWIDTH] IN BLOOD BY AUTOMATED COUNT: 13.3 % (ref 11.5–17)
GLOBULIN SER-MCNC: 2.7 G/DL (ref 2.4–3.5)
GLUCOSE (UA): NEGATIVE
GLUCOSE SERPL-MCNC: 78 MG/DL (ref 82–115)
HCT VFR BLD AUTO: 35.5 % (ref 37–47)
HDLC SERPL-MCNC: 82 MG/DL (ref 35–60)
HEMOLYSIS INTERF INDEX SERPL-ACNC: 16
HGB BLD-MCNC: 11 G/DL (ref 12–16)
HGB UR QL STRIP: NEGATIVE
ICTERIC INTERF INDEX SERPL-ACNC: 1
KETONES UR QL STRIP: NEGATIVE
LDLC SERPL CALC-MCNC: 79 MG/DL (ref 50–140)
LEUKOCYTE ESTERASE UR QL STRIP: NORMAL
LIPEMIC INTERF INDEX SERPL-ACNC: 0
LYMPHOCYTES # BLD AUTO: 1.4 10*3/UL (ref 0.6–4.6)
LYMPHOCYTES NFR BLD AUTO: 26 %
MANUAL DIFF? (OHS): NO
MCH RBC QN AUTO: 30.6 PG (ref 27–31)
MCHC RBC AUTO-ENTMCNC: 31 G/DL (ref 33–36)
MCV RBC AUTO: 98.6 FL (ref 80–94)
MONOCYTES # BLD AUTO: 0.6 10*3/UL (ref 0.1–1.3)
MONOCYTES NFR BLD AUTO: 11 %
NEUTROPHILS # BLD AUTO: 3.16 10*3/UL (ref 2.1–9.2)
NEUTROPHILS NFR BLD AUTO: 59 %
NITRITE UR QL STRIP: NEGATIVE
PH UR STRIP: 7 [PH] (ref 5–9)
PLATELET # BLD AUTO: 210 10*3/UL (ref 130–400)
PMV BLD AUTO: 9.9 FL (ref 9.4–12.4)
POTASSIUM SERPL-SCNC: 4.1 MMOL/L (ref 3.5–5.1)
PROT SERPL-MCNC: 6.5 G/DL (ref 5.8–7.6)
PROT UR QL STRIP: NEGATIVE
RBC # BLD AUTO: 3.6 10*6/UL (ref 4.2–5.4)
RBC #/AREA URNS HPF: NORMAL /[HPF] (ref 0–2)
SMALL ROUND CELLS, UA: NORMAL
SODIUM SERPL-SCNC: 139 MMOL/L (ref 136–145)
SP GR UR STRIP: 1.01 (ref 1–1.03)
SPERM URNS QL MICRO: NORMAL
SQUAMOUS EPITHELIAL, UA: NORMAL (ref 0–4)
TRIGL SERPL-MCNC: 48 MG/DL (ref 37–140)
TSH SERPL-ACNC: 2.99 M[IU]/L (ref 0.35–4.94)
UROBILINOGEN UR STRIP-ACNC: 0.2
VLDLC SERPL CALC-MCNC: 10 MG/DL
WBC # SPEC AUTO: 5.4 10*3/UL (ref 4.5–11.5)
WBC #/AREA URNS HPF: NORMAL /[HPF] (ref 0–2)

## 2022-04-05 LAB — CRC RECOMMENDATION EXT: NORMAL

## 2022-04-11 ENCOUNTER — HISTORICAL (OUTPATIENT)
Dept: ADMINISTRATIVE | Facility: HOSPITAL | Age: 77
End: 2022-04-11
Payer: MEDICARE

## 2022-04-26 VITALS
BODY MASS INDEX: 21.83 KG/M2 | HEIGHT: 62 IN | WEIGHT: 118.63 LBS | DIASTOLIC BLOOD PRESSURE: 80 MMHG | OXYGEN SATURATION: 98 % | SYSTOLIC BLOOD PRESSURE: 120 MMHG

## 2022-04-30 NOTE — DISCHARGE SUMMARY
"   Patient:   Denise Reece            MRN: [id number]            FIN: [id number]               Age:   73 years     Sex:  Female     :  1945   Associated Diagnoses:   Chest pain; Laryngeal spasm; Hypertension; Asthma; Anxiety   Author:   Georgi Silva MD      Discharge Information      Discharge Summary Information   Admitted  3/19/2019   Discharged  3/20/2019   Discharge diagnosis     Chest pain (AUY99-YZ R07.9).     Laryngeal spasm (QXV71-YY J38.5).     Hypertension (GYY34-MK I10).     Asthma (DWW85-CN J45.909).     Anxiety (GVV85-PD F41.9).        Hospital Course   73 year old female with history of HTN, HLD, asthma, and anxiety presented to the ER with complaints of upper chest discomfort contributed to "laryngeal spasms".  She reported prior diagnosis of laryngeal spasms for which she underwent therapy in the past.  She noted current episode was provoked by drinking H2O too quickly.  She was hypertensive on arrival to ER with /85.  BP now normalized at 138/77.  She was monitored overnight due to indeterminate troponin (0.21).  EKG showed no acute changes.   PET stress was negative 2019.     Patient denied recurrent discomfort with overnight monitoring.  Plan is to obtain an additional troponin, and, if negative, discharge home.  She is advised to follow-up with primary care in the next 1-2 weeks.      Discharge Plan   Discharge Summary Plan   Prescriptions:   albuterol (ProAir HFA 90 mcg/inh inhalation aerosol with adapter) 2 Puff(s) Inhalation every 6 hours as needed for wheezing. Refills: 0.  ascorbic acid (Vitamin C 250 mg oral tablet) 1 Tablet(s) Oral Daily.  clonazePAM (clonazePAM 0.5 mg oral tablet) 1 Tablet(s) Oral 2 times a day. WM. Refills: 5.  famotidine (famotidine 40 mg oral tablet) 1 Tablet(s) Oral 2 times a day. Refills: 11.  Misc Prescription (MONTELUKAST 10MG TAB) TAKE ONE TABLET BY MOUTH ONCE DAILY IN THE EVENING. Refills: 11.  ramipril (ramipril 5 mg oral capsule) " 1 Cap Oral Daily. Refills: 4.  sucralfate (Carafate 1 g/10 mL oral suspension) 10 Milliliter Oral 4 times a day for 10 Days. on an empty stomach. Refills: 6.  vitamin A (vitamin A 10,000 units oral capsule) 1 Cap Oral Daily.  .

## 2022-04-30 NOTE — ED PROVIDER NOTES
"   Patient:   Denise Reece            MRN: [id number]            FIN: [id number]               Age:   73 years     Sex:  Female     :  1945   Associated Diagnoses:   Chest pain   Author:   Leah CASTILLO, Huseyin HARDING      Basic Information   Time seen: Date & time 3/19/2019 20:20:00.   History source: Patient.   Arrival mode: Private vehicle.   History limitation: None.   Additional information: Patient's physician(s): Catherine CASTILLO, Antony DRAKE, Chief Complaint from Nursing Triage Note : Chief Complaint   3/19/2019 20:11 CDT      Chief Complaint           Pt c/o "largyngeal spasm" while at work. States that she is now feeling weak and having intermittent chest pain.  .      History of Present Illness   The patient presents with     I, Dr. Lynn, assumed care of this patient at 2106.  74 y/o female with a history of HTN, GERD, asthma, and anxiety presents to the ED with complaints of "laryngeal spasms" that started while she was leaving work today. Pt reports that she was frustrated at work, and drank some water too fast when her throat started to feel closed, and it felt like she was suffocating. Pt states that started to feel chest discomfort when her "pharyngeal spasms" resolved. Pt reports that she had an episode with the same symptoms approximately 6-7 years ago, and she is normally able to control the symptoms by breathing out fast. Pt states that she has a postnasal drip and has been taking Coricidin every morning. Pt reports that she still has some slight chest pain and feels weak.    .  The onset was "while leaving work this afternoon" .  The course/duration of symptoms is constant.  The character of symptoms is generalized.  The degree at present is moderate.  Risk factors consist of hypertension and   GERD  asthma   anxiety.  Prior episodes: "6-7 years ago".  Therapy today: none.  Associated symptoms: chest pain and Weakness.  Additional history: none.        Review of Systems " "  Constitutional symptoms:  Weakness   Skin symptoms:  Negative except as documented in HPI   Eye symptoms:  Negative except as documented in HPI.   ENMT symptoms:  "pharyngeal spasms", Postnasal drip.    Respiratory symptoms:  Negative except as documented in HPI   Cardiovascular symptoms:  Chest pain, left chest, discomfort, substernal   Gastrointestinal symptoms:  Negative except as documented in HPI.   Musculoskeletal symptoms:  Negative except as documented in HPI.   Neurologic symptoms:  Negative except as documented in HPI.   Psychiatric symptoms:  Negative except as documented in HPI             Additional review of systems information: All other systems reviewed and otherwise negative.      Health Status   Allergies:    Allergic Reactions (Selected)  Severity Not Documented  Benadryl- No reactions were documented.  Egg- No reactions were documented..   Medications:  (Selected)   Prescriptions  Prescribed  Carafate 1 g/10 mL oral suspension: 1 gm = 10 mL, Oral, QID, on an empty stomach, # 400 mL, 6 Refill(s), Pharmacy: [id number]  MONTELUKAST 10MG TAB: See Instructions, TAKE ONE TABLET BY MOUTH ONCE DAILY IN THE EVENING, # 30 tab(s), 11 Refill(s), eRx: [facility]  ProAir HFA 90 mcg/inh inhalation aerosol with adapter: 2 puff(s), INH, q6hr, PRN PRN for wheezing, # 1 EA, 0 Refill(s)  clonazePAM 0.5 mg oral tablet: 0.5 mg = 1 tab(s), Oral, BID, WM, # 60 tab(s), 5 Refill(s)  famotidine 40 mg oral tablet: 40 mg = 1 tab(s), Oral, BID, # 60 tab(s), 11 Refill(s), Pharmacy: [facility]  ramipril 5 mg oral capsule: 5 mg = 1 cap(s), Oral, Daily, # 180 cap(s), 4 Refill(s), Pharmacy: [facility]  traZODONE 50 mg oral tablet ( Desyrel ): 50 mg = 1 tab(s), Oral, Once a day (at bedtime), # 30 tab(s), 11 Refill(s), Pharmacy: [facility]  Documented Medications  Documented  Pantoprazole 40 mg ORAL EC-Tablet: 40 mg = 1 tab(s), Oral, At Bedtime, # 90 tab(s), 0 " Refill(s)  Vitamin C 250 mg oral tablet: 250 mg = 1 tab(s), Oral, Daily, # 30 tab(s), 0 Refill(s)  vitamin A 10,000 units oral capsule: = 1 cap(s), Oral, Daily, 0 Refill(s).      Past Medical/ Family/ Social History   Medical history:    Resolved  Asthma ([id number]):  Resolved.  Hypertension (TQ64P4G3--G7I5-[id number]):  Resolved.,   Osteoarthritis  Anemia  Anxiety  GERD.   Surgical history:    MAMMOGRAM on 2/6/2014 at 68 Years.  Lumpectomy..   Family history: Not significant.   Social history:    Social & Psychosocial Habits    Alcohol  05/02/2012 Risk Assessment: Denies Alcohol Use    04/15/2015  Use: Never    Employment/School  10/25/2016  Status: Employed    Exercise  04/15/2015 Risk Assessment: Occasional exercise    02/04/2016  Self assessment: Fair condition    Home/Environment  04/15/2015 Risk Assessment: No Risk    02/04/2016  Lives with: Alone    Nutrition/Health  04/15/2015 Risk Assessment: No Risk    02/04/2016  Type of diet: Regular    Sexual  02/04/2016  Sexually active: No    Substance Abuse  05/02/2012 Risk Assessment: Denies Substance Abuse    04/15/2015  Use: Never    Tobacco  05/02/2012 Risk Assessment: Denies Tobacco Use    03/12/2019  Use: Never (less than 100 in l    Patient Wants Consult For Cessation Counseling No    03/19/2019  Use: Never (less than 100 in l    Patient Wants Consult For Cessation Counseling N/A.      Physical Examination               Vital Signs   Vital Signs   3/19/2019 20:11 CDT      Temperature Oral          36.4 DegC                             Temperature Oral (calculated)             97.52 DegF                             Peripheral Pulse Rate     85 bpm                             Respiratory Rate          20 br/min                             SpO2                      95 %                             Oxygen Therapy            Room air                             Systolic Blood Pressure   162 mmHg  HI                              Diastolic Blood Pressure  85 mmHg  .   Measurements   3/19/2019 20:11 CDT      Weight Dosing             62.5 kg                             Weight Measured and Calculated in Lbs     137.79 lb                             Weight Estimated          62.5 kg                             Height/Length Dosing      157.48 cm                             Height/Length Estimated   157.48 cm                             Body Mass Index Estimated 25.2 kg/m2  .   Basic Oxygen Information   3/19/2019 20:11 CDT      SpO2                      95 %                             Oxygen Therapy            Room air  .   General:  Alert, no acute distress, well appearing, conversant   Skin:  Warm, dry, intact   Head:  Normocephalic, atraumatic   Neck:  Supple, trachea midline   Eye:  Pupils are equal, round and reactive to light, extraocular movements are intact, normal conjunctiva   Ears, nose, mouth and throat:  Oral mucosa moist, No drooling.    Cardiovascular:  Regular rate and rhythm, No murmur, Normal peripheral perfusion, No edema   Respiratory:  Lungs are clear to auscultation, respirations are non-labored, breath sounds are equal, Symmetrical chest wall expansion, No stridor.    Chest wall:  Left chest is tender to palpation.   Musculoskeletal:  Normal ROM, normal strength, no tenderness, no swelling, no deformity.    Gastrointestinal:  Soft, Nontender, Non distended   Neurological:  Alert and oriented to person, place, time, and situation, No focal neurological deficit observed, CN II-XII intact, normal sensory observed, normal motor observed, normal speech observed, normal coordination observed.    Psychiatric:  Cooperative, normal judgment, Mood and affect: Flat.       Medical Decision Making   Documents reviewed:  Emergency department nurses' notes.   Orders  Launch Order Profile (Selected)   Inpatient Orders  Ordered (Exam Completed)  XR Chest 2 Views: Stat, 03/19/19 20:20:00 CDT, Chest Pain, None, Stretcher, Rad Type, Not  Scheduled, 03/19/19 20:20:00 CDT  Completed  Aerosol Treatment: 03/19/19 20:21:00 CDT, 03/19/19 20:21:00 CDT, [id number]  Automated Diff: NOW collect, 03/19/19 20:56:00 CDT, Blood, Collected, Stop date 03/19/19 20:56:00 CDT, Lab Collect, Print Label By Order Location, 03/19/19 20:20:00 CDT  CBC w/ Auto Diff: NOW collect, 03/19/19 20:56:06 CDT, BLOOD, Collected, Stop date 03/19/19 20:56:00 CDT, Lab Collect  CMP: STAT collect, 03/19/19 20:56:06 CDT, BLOOD, Collected, Stop date 03/19/19 20:56:00 CDT, Lab Collect  CPK: STAT collect, 03/19/19 20:56:06 CDT, BLOOD, Collected, Stop date 03/19/19 20:56:00 CDT, Lab Collect  DuoNeb: 3 mL, form: Soln, NEB, Once-NOW, first dose 03/19/19 20:21:00 CDT, stop date 03/19/19 20:21:00 CDT, STAT  EKG Adult 12 Lead: 03/19/19 20:20:00 CDT, Stat, Once, 03/19/19 20:20:00 CDT, -1, -1, 03/19/19 20:20:00 CDT  Estimated Glomerular Filtration Rate: STAT collect, 03/19/19 20:56:00 CDT, Blood, Collected, Stop date 03/19/19 20:56:00 CDT, Lab Collect, Print Label By Order Location, 03/19/19 20:20:00 CDT  Troponin-I: STAT collect, 03/19/19 20:56:06 CDT, BLOOD, Collected, Stop date 03/19/19 20:21:00 CDT, Lab Collect.   Electrocardiogram:  Time 3/19/2019 20:14:00, rate 88, normal sinus rhythm, No ST-T changes, no ectopy, normal MN & QRS intervals, EP Interp.    Results review:  Lab results : Lab View   3/19/2019 20:56 CDT      Sodium Lvl                139 mmol/L                             Potassium Lvl             4.0 mmol/L                             Chloride                  105 mmol/L                             CO2                       29.0 mmol/L                             Calcium Lvl               8.3 mg/dL  LOW                             Glucose Lvl               99 mg/dL                             BUN                       21.0 mg/dL  HI                             Creatinine                0.78 mg/dL                             eGFR-AA                   >60 mL/min/1.73 m2  NA                              eGFR-MANFRED                  >60 mL/min/1.73 m2  NA                             Bili Total                0.4 mg/dL                             Bili Direct               0.10 mg/dL                             Bili Indirect             0.30 mg/dL                             AST                       20 unit/L                             ALT                       14 unit/L                             Alk Phos                  73 unit/L                             Total Protein             7.0 gm/dL                             Albumin Lvl               3.50 gm/dL                             Globulin                  3.50 gm/dL                             A/G Ratio                 1.0 ratio  LOW                             Total CK                  120 unit/L                             Troponin-I                0.21 ng/mL                             WBC                       8.2 x10(3)/mcL                             RBC                       3.80 x10(6)/mcL  LOW                             Hgb                       10.9 gm/dL  LOW                             Hct                       35.4 %  LOW                             Platelet                  223 x10(3)/mcL                             MCV                       93.2 fL                             MCH                       28.7 pg                             MCHC                      30.8 gm/dL  LOW                             RDW                       13.5 %                             MPV                       9.6 fL                             Abs Neut                  5.17 x10(3)/mcL                             Neutro Auto               63 %  NA                             Lymph Auto                26 %  NA                             Mono Auto                 7 %  NA                             Eos Auto                  4 %  NA                             Abs Eos                   0.3 x10(3)/mcL                             Basophil Auto              0 %  NA                             Abs Neutro                5.17 x10(3)/mcL                             Abs Lymph                 2.1 x10(3)/mcL                             Abs Mono                  0.6 x10(3)/mcL                             Abs Baso                  0.0 x10(3)/mcL  .      Reexamination/ Reevaluation   Course: improving.   Pain status: resolved.   Interventions: troponin 0.21, will admit to trend, pt given asa in ED - no cp/sob as of admit time.      Impression and Plan   Diagnosis   Chest pain (UWO89-XL R07.9)      Calls-Consults   -  3/19/2019 22:41:00 , Catherine CASTILLO, Antony DRAKE, Paged.    -  3/19/2019 22:47:00 , Catherine CASTILLO, Antony DRAKE, phone call, Admit, per [facility].    Plan   Condition: Stable.    Disposition: Place in Observation Telemetry Unit.    Counseled: Patient, Family, Regarding diagnosis, Regarding diagnostic results, Regarding treatment plan, Patient indicated understanding of instructions, Family understood.    Notes: I, Amelia LeJeune, acted solely as a scribe for and in the presence of Dr. Lynn who performed the service. .       Addendum      Teaching-Supervisory Addendum-Brief   Notes: I, Dr. Lynn, personally performed the services described in this documentation as scribed in my presence and it is both accurate and complete..

## 2022-04-30 NOTE — H&P
"   Patient:   Denise Reece            MRN: [id number]            FIN: [id number]               Age:   73 years     Sex:  Female     :  1945   Associated Diagnoses:   None   Author:   Shira CASTILLO, Georgi LEIGH      Basic Information   Source of history:  Self.    Present at bedside:  Family member.    Referral source:  Emergency department.    History limitation:  None.       Chief Complaint   3/19/2019 20:11 CDT      Pt c/o "largyngeal spasm" while at work. States that she is now feeling weak and having intermittent chest pain.        History of Present Illness   73 year old female with history of HTN, HLD, asthma, and anxiety presented to the ER with complaints of upper chest discomfort contributed to "laryngeal spasms".  She reported prior diagnosis of laryngeal spasms for which she underwent therapy in the past.  She noted current episode was provoked by drinking H2O too quickly.  She was hypertensive on arrival to ER with /85.  BP now normalized at 138/77.  She was monitored overnight due to indeterminate troponin (0.21).  EKG showed no acute changes.   PET stress was negative 2019.      Review of Systems   Constitutional:  Negative.    Eye:  Negative.    Ear/Nose/Mouth/Throat:  Nasal congestion.    Respiratory:  Cough, No shortness of breath.    Cardiovascular:  Chest pain, No palpitations, No peripheral edema.    Gastrointestinal:  Negative.    Genitourinary:  Negative.    Hematology/Lymphatics:  Negative.    Endocrine:  Negative.    Immunologic:  Negative.    Musculoskeletal:  No claudication, No trauma.    Integumentary:  Negative.    Neurologic:  Negative.    Psychiatric:  Negative.       Health Status   Allergies:    Allergic Reactions (Selected)  Severity Not Documented  Benadryl- No reactions were documented.  Egg- No reactions were documented.   Current medications:  (Selected)   Inpatient Medications  Ordered  Maalox Antacid with Anti-Gas: 30 mL, form: Susp, Oral, q4hr PRN for " dyspepsia, first dose 03/20/19 3:27:00 CDT  Milk of Magnesia: 30 mL, form: Susp, Oral, Daily PRN for constipation, first dose 03/20/19 3:27:00 CDT  Norco 5 mg-325 mg oral tablet: 1 tab(s), form: Tab, Oral, q4hr PRN for pain, moderate, first dose 03/20/19 3:27:00 CDT  Norco 5 mg-325 mg oral tablet: 2 tab(s), form: Tab, Oral, q4hr PRN for pain, severe, first dose 03/20/19 3:27:00 CDT  Senokot S: 1 tab(s), form: Tab, Oral, BID PRN for constipation, first dose 03/20/19 3:27:00 CDT, choose only if unrelieved by Milk of Magnesia or choose first if ordered alone  Tylenol: 1,000 mg, form: Tab, Oral, q6hr PRN for pain, mild, first dose 03/20/19 3:27:00 CDT, or fever; No more than 4 grams in 24 hours  Tylenol: 650 mg, form: Supp, [place], q6hr PRN for pain, first dose 03/20/19 3:27:00 CDT, mild or fever if patient unable to swallow; No more than 4 grams in 24 hours  Xanax: 0.25 mg, form: Tab, Oral, q6hr PRN for anxiety, first dose 03/20/19 3:27:00 CDT  Xylocaine Jelly 2% topical gel with applicator: 5 mL, form: Gel, TOP, Once PRN for other (see comment), first dose 03/20/19 3:27:00 CDT, for insertion of urinary catheter in males  aspirin: 325 mg, form: Tab, Oral, Daily, first dose 03/20/19 3:27:00 CDT, STAT  nitroglycerin: 0.4 mg, form: Tab-SL, SL, q5min PRN for chest pain, Order duration: 3 dose(s), first dose 03/20/19 3:27:00 CDT, stop date Limited # of times, if systolic BP > 100 until pain relieved of at level 1  Prescriptions  Prescribed  Carafate 1 g/10 mL oral suspension: 1 gm = 10 mL, Oral, QID, on an empty stomach, # 400 mL, 6 Refill(s), Pharmacy: [id number]  MONTELUKAST 10MG TAB: See Instructions, TAKE ONE TABLET BY MOUTH ONCE DAILY IN THE EVENING, # 30 tab(s), 11 Refill(s), eRx: [id number]  ProAir HFA 90 mcg/inh inhalation aerosol with adapter: 2 puff(s), INH, q6hr, PRN PRN for wheezing, # 1 EA, 0 Refill(s)  clonazePAM 0.5 mg oral tablet: 0.5 mg = 1 tab(s), Oral, BID, WM, # 60 tab(s), 5  Refill(s)  famotidine 40 mg oral tablet: 40 mg = 1 tab(s), Oral, BID, # 60 tab(s), 11 Refill(s), Pharmacy: [facility]  ramipril 5 mg oral capsule: 5 mg = 1 cap(s), Oral, Daily, # 180 cap(s), 4 Refill(s), Pharmacy: [facility]  traZODONE 50 mg oral tablet ( Desyrel ): 50 mg = 1 tab(s), Oral, Once a day (at bedtime), # 30 tab(s), 11 Refill(s), Pharmacy: [facility]  Documented Medications  Documented  Pantoprazole 40 mg ORAL EC-Tablet: 40 mg = 1 tab(s), Oral, At Bedtime, # 90 tab(s), 0 Refill(s)  Vitamin C 250 mg oral tablet: 250 mg = 1 tab(s), Oral, Daily, # 30 tab(s), 0 Refill(s)  vitamin A 10,000 units oral capsule: = 1 cap(s), Oral, Daily, 0 Refill(s)      Histories   Past Medical History:    Resolved  Asthma ([id number]):  Resolved.  Hypertension (WO41T0A1--[id number]):  Resolved.   Family History:    History is unknown.   Procedure history:    MAMMOGRAM on 2/6/2014 at 68 Years.  Lumpectomy.   Social History        Social & Psychosocial Habits    Alcohol  05/02/2012 Risk Assessment: Denies Alcohol Use    04/15/2015  Use: Never    Employment/School  10/25/2016  Status: Employed    Exercise  04/15/2015 Risk Assessment: Occasional exercise    02/04/2016  Self assessment: Fair condition    Home/Environment  04/15/2015 Risk Assessment: No Risk    02/04/2016  Lives with: Alone    Nutrition/Health  04/15/2015 Risk Assessment: No Risk    02/04/2016  Type of diet: Regular    Sexual  02/04/2016  Sexually active: No    Substance Abuse  05/02/2012 Risk Assessment: Denies Substance Abuse    04/15/2015  Use: Never    Tobacco  05/02/2012 Risk Assessment: Denies Tobacco Use    03/12/2019  Use: Never (less than 100 in l    Patient Wants Consult For Cessation Counseling No    03/19/2019  Use: Never (less than 100 in l    Patient Wants Consult For Cessation Counseling N/A    03/20/2019  Use: Never (less than 100 in l    Patient Wants Consult For Cessation  Counseling N/A.        Physical Examination      Vital Signs (last 24 hrs)_____  Last Charted___________  Temp Oral     36.8 DegC  (MAR 20 03:00)  Heart Rate Peripheral   97 bpm  (MAR 20 03:00)  Resp Rate         17 br/min  (MAR 20 00:36)  SBP      138 mmHg  (MAR 20 03:00)  DBP      77 mmHg  (MAR 20 03:00)  SpO2      94 %  (MAR 20 03:00)  Weight      62.5 kg  (MAR 20 03:08)  Height      157.48 cm  (MAR 20 03:08)  BMI      25.2  (MAR 20 03:08)   General:  Alert and oriented, No acute distress.    Eye:  Extraocular movements are intact, Normal conjunctiva.    HENT:  Normocephalic, Normal hearing, Oral mucosa is moist.    Neck:  Supple, No carotid bruit, No jugular venous distention, No lymphadenopathy.    Respiratory:  Lungs are clear to auscultation, Respirations are non-labored, Breath sounds are equal, Symmetrical chest wall expansion.    Cardiovascular:  Normal rate, Regular rhythm, No murmur, No edema.    Gastrointestinal:  Soft, Non-tender, Non-distended, Normal bowel sounds.    Musculoskeletal:  Normal strength, No tenderness, No swelling, No deformity.    Integumentary:  Warm, Dry, Intact.    Neurologic:  Alert, Oriented, No focal deficits.    Psychiatric:  Cooperative, Appropriate mood & affect, Normal judgment.       Review / Management   Results review:     Labs (Last four charted values)  WBC                  8.2 (MAR 19)   Hgb                  L 10.9 (MAR 19)   Hct                  L 35.4 (MAR 19)   Plt                  223 (MAR 19)   Na                   139 (MAR 19)   K                    4.0 (MAR 19)   CO2                  29.0 (MAR 19)   Cl                   105 (MAR 19)   Cr                   0.78 (MAR 19)   BUN                  H 21.0 (MAR 19)   Glucose Random       99 (MAR 19) .    Cardiac Monitor:  Reveals a Normal sinus rhythm.    ECG interpretation:  Normal sinus rhythm, No ST-T changes.    Condition:  Stable.       Impression and Plan   1.  Acute onset chest discomfort with indeterminate  troponin.  Repeat troponin not obtained.  Patient contributes episode to laryngeal spasm with typical provoking factors.  Will obtain repeat troponin and if negative plan for discharge home.  PET stress test was negative 1/2019.  2.  HTN.  Continue home meds.  3.  HLD.  Recommended dietary modification.  4.  Asthma.  No exacerbation noted.  5.  Anxiety disorder.

## 2022-06-03 RX ORDER — DICYCLOMINE HYDROCHLORIDE 10 MG/1
CAPSULE ORAL
Qty: 25 CAPSULE | Refills: 0 | Status: SHIPPED | OUTPATIENT
Start: 2022-06-03 | End: 2023-08-07

## 2022-06-03 RX ORDER — FAMOTIDINE 20 MG/1
20 TABLET, FILM COATED ORAL 2 TIMES DAILY
Qty: 20 TABLET | Refills: 0 | Status: SHIPPED | OUTPATIENT
Start: 2022-06-03 | End: 2024-03-20

## 2022-06-03 NOTE — TELEPHONE ENCOUNTER
----- Message from Arina Tripathi sent at 6/3/2022  9:08 AM CDT -----  On Wednesday night developed severe pain in stomach, thinks its what she ate. Pepcid helped  Went from stomach to intestines  Still has a discomfort, can tolerate dry food, egg, bread.  No vomiting and diarrhea    Uses [facility] on [facility]    [phone]

## 2022-07-06 RX ORDER — ROSUVASTATIN CALCIUM 10 MG/1
TABLET, COATED ORAL
Qty: 90 TABLET | Refills: 0 | Status: SHIPPED | OUTPATIENT
Start: 2022-07-06 | End: 2022-11-01

## 2022-07-25 ENCOUNTER — TELEPHONE (OUTPATIENT)
Dept: INTERNAL MEDICINE | Facility: CLINIC | Age: 77
End: 2022-07-25
Payer: MEDICARE

## 2022-07-25 NOTE — TELEPHONE ENCOUNTER
Thank you for the update    ----- Message from Arina Bhumi sent at 7/25/2022  3:14 PM CDT -----  Regarding: answering service call  Called to say she went to see urologist Dr Carpenter, all tests done were normal.  She started passing blood Saturday morning. Drips of blood  Not sure where this was coming from. The bleeding has now stopped. She does have a call into her urology office.   Also wanted to report this to Dr Forte    [phone]

## 2022-08-24 ENCOUNTER — TELEPHONE (OUTPATIENT)
Dept: INTERNAL MEDICINE | Facility: CLINIC | Age: 77
End: 2022-08-24
Payer: MEDICARE

## 2022-08-24 DIAGNOSIS — U07.1 COVID: Primary | ICD-10-CM

## 2022-08-24 RX ORDER — PREDNISONE 10 MG/1
10 TABLET ORAL 2 TIMES DAILY
Qty: 14 TABLET | Refills: 0 | Status: SHIPPED | OUTPATIENT
Start: 2022-08-24 | End: 2022-08-31

## 2022-08-24 RX ORDER — DOXYCYCLINE 100 MG/1
100 CAPSULE ORAL 2 TIMES DAILY
Qty: 14 CAPSULE | Refills: 0 | Status: SHIPPED | OUTPATIENT
Start: 2022-08-24 | End: 2022-08-31

## 2022-08-24 RX ORDER — RAMIPRIL 5 MG/1
10 CAPSULE ORAL DAILY
COMMUNITY
Start: 2022-08-04 | End: 2023-02-09 | Stop reason: SDUPTHER

## 2022-08-24 NOTE — TELEPHONE ENCOUNTER
Call returned see last message    ----- Message from Alexandra Calhoun Patient Care Assistant sent at 8/24/2022  2:00 PM CDT -----  Regarding: return call  Type:  Needs Medical Advice    Who Called: pt left VM    Symptoms (please be specific): tested positive for covid with at home test     Would the patient rather a call back or a response via [facility]? C/b    Best Call Back Number: [phone]    Additional Information: pt tested positive with at home test and she is needing to know where to go or what she should do. Pt is needing some advice please call her back.

## 2022-08-24 NOTE — TELEPHONE ENCOUNTER
----- Message from Arina Tripathi sent at 8/24/2022  2:48 PM CDT -----  Went to [facility] urgent  care in [place] because of covid. She tested positive.  Has questions about what medications she should take.  Was not prescribed any medication at the urgent care  Uses [facility] on [address]    [phone]

## 2022-08-24 NOTE — TELEPHONE ENCOUNTER
Per Dr. Forte we will send out meds, she checked herself at home and is positive for covid. She will go to a [facility] to verify that it was correct but still wants us to call out the medication. Will send at this time    ----- Message from Arina Triapthi sent at 8/24/2022  9:07 AM CDT -----  Temp 98.1, 98.2, irritated throat, slight sinus drip, head congestion.   Tried Coricidin last night and this morning, still not feeling well  Uses [facility] on [address]    [phone], please let her know

## 2022-08-25 ENCOUNTER — TELEPHONE (OUTPATIENT)
Dept: INTERNAL MEDICINE | Facility: CLINIC | Age: 77
End: 2022-08-25
Payer: MEDICARE

## 2022-08-25 NOTE — TELEPHONE ENCOUNTER
Call returned    ----- Message from Arina Tripathi sent at 8/25/2022  2:00 PM CDT -----  Has questions about the 3 medications that were sent out on yesterday, I tried to help  [phone]

## 2022-09-06 ENCOUNTER — LAB VISIT (OUTPATIENT)
Dept: LAB | Facility: HOSPITAL | Age: 77
End: 2022-09-06
Attending: INTERNAL MEDICINE
Payer: MEDICARE

## 2022-09-06 ENCOUNTER — TELEPHONE (OUTPATIENT)
Dept: INTERNAL MEDICINE | Facility: CLINIC | Age: 77
End: 2022-09-06
Payer: MEDICARE

## 2022-09-06 DIAGNOSIS — I10 ESSENTIAL HYPERTENSION, MALIGNANT: ICD-10-CM

## 2022-09-06 DIAGNOSIS — Z00.00 ROUTINE GENERAL MEDICAL EXAMINATION AT A HEALTH CARE FACILITY: Primary | ICD-10-CM

## 2022-09-06 LAB
ALBUMIN SERPL-MCNC: 3.9 GM/DL (ref 3.4–4.8)
ALBUMIN/GLOB SERPL: 1.6 RATIO (ref 1.1–2)
ALP SERPL-CCNC: 51 UNIT/L (ref 40–150)
ALT SERPL-CCNC: 8 UNIT/L (ref 0–55)
APPEARANCE UR: CLEAR
AST SERPL-CCNC: 20 UNIT/L (ref 5–34)
BACTERIA #/AREA URNS AUTO: NORMAL /HPF
BASOPHILS # BLD AUTO: 0.02 X10(3)/MCL (ref 0–0.2)
BASOPHILS NFR BLD AUTO: 0.3 %
BILIRUB UR QL STRIP.AUTO: NEGATIVE MG/DL
BILIRUBIN DIRECT+TOT PNL SERPL-MCNC: 0.5 MG/DL
BUN SERPL-MCNC: 18.1 MG/DL (ref 9.8–20.1)
CALCIUM SERPL-MCNC: 9.1 MG/DL (ref 8.4–10.2)
CHLORIDE SERPL-SCNC: 106 MMOL/L (ref 98–107)
CHOLEST SERPL-MCNC: 175 MG/DL
CHOLEST/HDLC SERPL: 3 {RATIO} (ref 0–5)
CO2 SERPL-SCNC: 29 MMOL/L (ref 23–31)
COLOR UR AUTO: YELLOW
CREAT SERPL-MCNC: 0.73 MG/DL (ref 0.55–1.02)
EOSINOPHIL # BLD AUTO: 0.07 X10(3)/MCL (ref 0–0.9)
EOSINOPHIL NFR BLD AUTO: 1.1 %
ERYTHROCYTE [DISTWIDTH] IN BLOOD BY AUTOMATED COUNT: 13 % (ref 11.5–17)
GFR SERPLBLD CREATININE-BSD FMLA CKD-EPI: >60 MLS/MIN/1.73/M2
GLOBULIN SER-MCNC: 2.4 GM/DL (ref 2.4–3.5)
GLUCOSE SERPL-MCNC: 77 MG/DL (ref 82–115)
GLUCOSE UR QL STRIP.AUTO: NEGATIVE MG/DL
HCT VFR BLD AUTO: 34.2 % (ref 37–47)
HDLC SERPL-MCNC: 67 MG/DL (ref 35–60)
HGB BLD-MCNC: 10.7 GM/DL (ref 12–16)
IMM GRANULOCYTES # BLD AUTO: 0.02 X10(3)/MCL (ref 0–0.04)
IMM GRANULOCYTES NFR BLD AUTO: 0.3 %
KETONES UR QL STRIP.AUTO: NEGATIVE MG/DL
LDLC SERPL CALC-MCNC: 99 MG/DL (ref 50–140)
LEUKOCYTE ESTERASE UR QL STRIP.AUTO: NEGATIVE UNIT/L
LYMPHOCYTES # BLD AUTO: 2.2 X10(3)/MCL (ref 0.6–4.6)
LYMPHOCYTES NFR BLD AUTO: 36.1 %
MCH RBC QN AUTO: 30.1 PG (ref 27–31)
MCHC RBC AUTO-ENTMCNC: 31.3 MG/DL (ref 33–36)
MCV RBC AUTO: 96.1 FL (ref 80–94)
MONOCYTES # BLD AUTO: 0.45 X10(3)/MCL (ref 0.1–1.3)
MONOCYTES NFR BLD AUTO: 7.4 %
NEUTROPHILS # BLD AUTO: 3.3 X10(3)/MCL (ref 2.1–9.2)
NEUTROPHILS NFR BLD AUTO: 54.8 %
NITRITE UR QL STRIP.AUTO: NEGATIVE
NRBC BLD AUTO-RTO: 0 %
PH UR STRIP.AUTO: 7.5 [PH]
PLATELET # BLD AUTO: 209 X10(3)/MCL (ref 130–400)
PMV BLD AUTO: 9.7 FL (ref 7.4–10.4)
POTASSIUM SERPL-SCNC: 4.2 MMOL/L (ref 3.5–5.1)
PROT SERPL-MCNC: 6.3 GM/DL (ref 5.8–7.6)
PROT UR QL STRIP.AUTO: NEGATIVE MG/DL
RBC # BLD AUTO: 3.56 X10(6)/MCL (ref 4.2–5.4)
RBC #/AREA URNS AUTO: <5 /HPF
RBC UR QL AUTO: NEGATIVE UNIT/L
SODIUM SERPL-SCNC: 142 MMOL/L (ref 136–145)
SP GR UR STRIP.AUTO: 1 (ref 1–1.03)
SQUAMOUS #/AREA URNS AUTO: <5 /HPF
TRIGL SERPL-MCNC: 44 MG/DL (ref 37–140)
TSH SERPL-ACNC: 1.75 UIU/ML (ref 0.35–4.94)
UROBILINOGEN UR STRIP-ACNC: 0.2 MG/DL
VLDLC SERPL CALC-MCNC: 9 MG/DL
WBC # SPEC AUTO: 6.1 X10(3)/MCL (ref 4.5–11.5)
WBC #/AREA URNS AUTO: <5 /HPF

## 2022-09-06 PROCEDURE — 81001 URINALYSIS AUTO W/SCOPE: CPT

## 2022-09-06 PROCEDURE — 85025 COMPLETE CBC W/AUTO DIFF WBC: CPT

## 2022-09-06 PROCEDURE — 80061 LIPID PANEL: CPT

## 2022-09-06 PROCEDURE — 84443 ASSAY THYROID STIM HORMONE: CPT

## 2022-09-06 PROCEDURE — 80053 COMPREHEN METABOLIC PANEL: CPT

## 2022-09-06 PROCEDURE — 36415 COLL VENOUS BLD VENIPUNCTURE: CPT

## 2022-09-06 NOTE — TELEPHONE ENCOUNTER
Labs in powerchart  ----- Message from Alexandra Calhoun, Patient Care Assistant sent at 9/6/2022 10:15 AM CDT -----  Regarding: lab orders for upcoming hussain  Pt is going today to get labs done please put her labs in the system. Thank you!

## 2022-09-13 ENCOUNTER — OFFICE VISIT (OUTPATIENT)
Dept: INTERNAL MEDICINE | Facility: CLINIC | Age: 77
End: 2022-09-13
Payer: MEDICARE

## 2022-09-13 VITALS
HEART RATE: 68 BPM | HEIGHT: 61 IN | OXYGEN SATURATION: 98 % | DIASTOLIC BLOOD PRESSURE: 80 MMHG | SYSTOLIC BLOOD PRESSURE: 120 MMHG | BODY MASS INDEX: 21.71 KG/M2 | WEIGHT: 115 LBS

## 2022-09-13 DIAGNOSIS — I10 HYPERTENSION, UNSPECIFIED TYPE: ICD-10-CM

## 2022-09-13 DIAGNOSIS — E78.5 HYPERLIPIDEMIA, UNSPECIFIED HYPERLIPIDEMIA TYPE: ICD-10-CM

## 2022-09-13 DIAGNOSIS — K21.9 GASTROESOPHAGEAL REFLUX DISEASE, UNSPECIFIED WHETHER ESOPHAGITIS PRESENT: ICD-10-CM

## 2022-09-13 DIAGNOSIS — Z00.00 WELL ADULT EXAM: Primary | ICD-10-CM

## 2022-09-13 DIAGNOSIS — D64.9 ANEMIA, UNSPECIFIED TYPE: ICD-10-CM

## 2022-09-13 DIAGNOSIS — R05.9 COUGH: ICD-10-CM

## 2022-09-13 PROCEDURE — G0438 PPPS, INITIAL VISIT: HCPCS | Mod: ,,, | Performed by: INTERNAL MEDICINE

## 2022-09-13 PROCEDURE — G0438 PR WELCOME MEDICARE ANNUAL WELLNESS INITIAL VISIT: ICD-10-PCS | Mod: ,,, | Performed by: INTERNAL MEDICINE

## 2022-09-13 RX ORDER — BENZONATATE 100 MG/1
100 CAPSULE ORAL 3 TIMES DAILY PRN
Qty: 21 CAPSULE | Refills: 0 | Status: SHIPPED | OUTPATIENT
Start: 2022-09-13 | End: 2022-10-12

## 2022-09-13 RX ORDER — CARBOXYMETHYLCELLULOSE SODIUM 5 MG/ML
1 SOLUTION/ DROPS OPHTHALMIC 3 TIMES DAILY PRN
COMMUNITY

## 2022-09-13 RX ORDER — CLONAZEPAM 0.5 MG/1
0.5 TABLET ORAL 2 TIMES DAILY WITH MEALS
COMMUNITY
Start: 2022-08-06 | End: 2022-10-17

## 2022-09-13 RX ORDER — TRIAMCINOLONE ACETONIDE 55 UG/1
2 SPRAY, METERED NASAL DAILY
COMMUNITY
End: 2024-03-20

## 2022-09-13 NOTE — PROGRESS NOTES
Patient ID: [id number]     Chief Complaint: Medicare AWV      HPI:     Denise Reece is a 76 y.o. female here today for a Medicare Wellness. This patient is an established patient. Her active problem list and current medication listed in her chart will be reviewed at the time of this visit. This patient's medical illnesses have been well recognized and documented in her chart. A review of systems will be taken at the time of this visit and any new information necessary for her care will be documented. She has done well since her last visit to the office. She has been compliant in taking medication, and refilling her medication on a regular schedule. She had laboratory studies drawn prior to her office visit, and I took the liberty to review these results in detail with her. I have given her a hand written explanation of the results for her records.  She has been doing well, and she elected to go on a weight reduction diet, and she has reached 115 lb.  She feels more comfortable at this weight, and she is able to maintain this weight well.  She has not required any hospital visits and or any visits to a walk-in clinic since she was last seen in the office.  She has maintained all of the usual medication that she has taken.  She has received the COVID vaccinations.          Opioid Screening: Patient medication list reviewed, patient is not taking prescription opioids. Patient is not using additional opioids than prescribed. Patient is at low risk of substance abuse based on this opioid use history.       ----------------------------  Allergy  Anemia  Anxiety  GERD (gastroesophageal reflux disease)  Hyperlipidemia  Hypertension  Osteoporosis     Past Surgical History:   Procedure Laterality Date    BREAST LUMPECTOMY         Review of patient's allergies indicates:   Allergen Reactions    Benadryl allergy decongestant        Outpatient Medications Marked as Taking for the 9/13/22 encounter (Office Visit) with  Estevan Forte Jr., MD   Medication Sig Dispense Refill    ascorbic acid-bioflavonoids 200-300 mg Tab Take by mouth.      calcium carbonate-vitamin D3 (CALCIUM 600 WITH VITAMIN D3) 600 mg-10 mcg (400 unit) Chew Take by mouth.      carboxymethylcellulose (REFRESH PLUS) 0.5 % Dpet 1 drop 3 (three) times daily as needed.      clonazePAM (KLONOPIN) 0.5 MG tablet Take 0.5 mg by mouth 2 (two) times daily with meals.      dicyclomine (BENTYL) 10 MG capsule Take 1 tablet every 4-6 hours prn abdominal pain 25 capsule 0    ramipriL (ALTACE) 5 MG capsule Take 10 mg by mouth once daily.      rosuvastatin (CRESTOR) 10 MG tablet Take 1 tablet by mouth once daily 90 tablet 0    triamcinolone (NASACORT) 55 mcg nasal inhaler 2 sprays by Nasal route once daily.         Social History     Socioeconomic History    Marital status:    Tobacco Use    Smoking status: Never    Smokeless tobacco: Never   Substance and Sexual Activity    Alcohol use: Never    Drug use: Never    Sexual activity: Not Currently        History reviewed. No pertinent family history.     Patient Care Team:  Estevan Forte Jr., MD as PCP - General (Internal Medicine)  Estevan Forte Jr., MD       Subjective:     Review of Systems   Constitutional: Negative.    HENT: Negative.     Eyes: Negative.    Respiratory: Negative.     Cardiovascular: Negative.    Gastrointestinal: Negative.    Genitourinary: Negative.    Musculoskeletal: Negative.    Skin: Negative.    Neurological: Negative.    Endo/Heme/Allergies: Negative.    Psychiatric/Behavioral: Negative.     All other systems reviewed and are negative.      Patient Reported Health Risk Assessment  [age] 70-79  Are you male or female?: Female  During the past four weeks, how much have you been bothered by emotional problems such as feeling anxious, depressed, irritable, sad, or downhearted and blue?: Not at all  During the past five weeks, has your physical and/or emotional  health limited your social activities with family, friends, neighbors, or groups?: Not at all  During the past four weeks, how much bodily pain have you generally had?: No pain  During the past four weeks, was someone available to help if you needed and wanted help?: Yes, as much as I wanted  During the past four weeks, what was the hardest physical activity you could do for at least two minutes?: Moderate  Can you get to places out of walking distance without help?  (For example, can you travel alone on buses or taxis, or drive your own car?): Yes  Can you go shopping for groceries or clothes without someone's help?: Yes  Can you prepare your own meals?: Yes  Can you do your own housework without help?: Yes  Because of any health problems, do you need the help of another person with your personal care needs such as eating, bathing, dressing, or getting around the house?: No  Can you handle your own money without help?: Yes  During the past four weeks, how would you rate your health in general?: Very good  How have things been going for you during the past four weeks?: Very well  Are you having difficulties driving your car?: No  Do you always fasten your seat belt when you are in a car?: Yes, usually  How often in the past four weeks have you been bothered by falling or dizzy when standing up?: Never  How often in the past four weeks have you been bothered by sexual problems?: Never  How often in the past four weeks have you been bothered by trouble eating well?: Never  How often in the past four weeks have you been bothered by teeth or denture problems?: Never  How often in the past four weeks have you been bothered with problems using the telephone?: Never  How often in the past four weeks have you been bothered by tiredness or fatigue?: Never  Have you fallen two or more times in the past year?: No  Are you afraid of falling?: No  Are you a smoker?: No  During the past four weeks, how many drinks of wine, beer,  "or other alcoholic beverages did you have?: No alcohol at all  Do you exercise for about 20 minutes three or more days a week?: Yes, some of the time  Have you been given any information to help you with hazards in your house that might hurt you?: Yes  Have you been given any information to help you with keeping track of your medications?: Yes  How often do you have trouble taking medicines the way you've been told to take them?: I always take them as prescribed  How confident are you that you can control and manage most of your health problems?: Very confident  What is your race? (Check all that apply.):     Objective:     /80   Pulse 68   Ht 5' 1" (1.549 m)   Wt 52.2 kg (115 lb)   SpO2 98%   BMI 21.73 kg/m²     Physical Exam  Vitals and nursing note reviewed.   Constitutional:       Appearance: Normal appearance. She is normal weight.   Cardiovascular:      Rate and Rhythm: Normal rate and regular rhythm.      Pulses: Normal pulses.      Heart sounds: Normal heart sounds.   Pulmonary:      Effort: Pulmonary effort is normal.      Breath sounds: Normal breath sounds.   Abdominal:      General: Abdomen is flat. Bowel sounds are normal.      Palpations: Abdomen is soft.   Musculoskeletal:         General: Normal range of motion.      Cervical back: Normal range of motion.   Skin:     General: Skin is warm.   Neurological:      General: No focal deficit present.      Mental Status: She is alert and oriented to person, place, and time.   Psychiatric:         Mood and Affect: Mood normal.         Behavior: Behavior normal.         Thought Content: Thought content normal.         Judgment: Judgment normal.       No flowsheet data found.  Fall Risk Assessment - Outpatient 9/13/2022   Mobility Status Ambulatory   Number of falls 0   Identified as fall risk 0           Depression Screening  Over the past two weeks, has the patient felt down, depressed, or hopeless?: No  Over the past two weeks, " has the patient felt little interest or pleasure in doing things?: No  Functional Ability/Safety Screening  Was the patient's timed Up & Go test unsteady or longer than 30 seconds?: No  Does the patient need help with phone, transportation, shopping, preparing meals, housework, laundry, meds, or managing money?: No  Does the patient's home have rugs in the hallway, lack grab bars in the bathroom, lack handrails on the stairs or have poor lighting?: No  Have you noticed any hearing difficulties?: No  Cognitive Function (Assessed through direct observation with due consideration of information obtained by way of patient reports and/or concerns raised by family, friends, caretakers, or others)    Does the patient repeat questions/statements in the same day?: No  Does the patient have trouble remembering the date, year, and time?: No  Does the patient have difficulty managing finances?: No  Does the patient have a decreased sense of direction?: No  Assessment/Plan:     1. Well adult exam    2. Hyperlipidemia, unspecified hyperlipidemia type    3. Gastroesophageal reflux disease, unspecified whether esophagitis present    4. Anemia, unspecified type    5. Hypertension, unspecified type     This patient is an established patient who has come in for an examination. She has done very well since her last visit to the office. She has a negative review of systems, except as mentioned above. She has not had any new problems to develop in the recent past. I was able to review her laboratory studies with her completely, as mentioned in the history of present illness. I will make medication changes as necessary, and her follow-up will continue as before.    This patient should continue to take all medication chronically given for known medical illnesses.    I discussed blood pressure management strategies with the patient today. I also recommend a low salt and low pork diet. We discussed antihypertensive medication. I have  stressed an increase in physical activity and exercise.    I held a discussion about cholesterol management with the patient today. The patient understands better than before and will try to change the medication regimen and diet.  Medication taken to lower cholesterol are best taken at bedtime.    I will need to review the workup that the patient has had for an underlying anemia.  I have talked to her about this, and further evaluation will be done, and she will be hyde for a update of this finding.    30 minutes were needed to perform an H and P, and to review this patient's test that were taken. It also required an additional 10 minutes to complete this electronic health record, which totaled 40 minutes of time and spent with the patient.    Medicare Annual Wellness and Personalized Prevention Plan:   Fall Risk + Home Safety + Hearing Impairment + Depression Screen + Opioid and Substance Abuse Screening + Cognitive Impairment Screen + Health Risk Assessment all reviewed.     Health Maintenance Topics with due status: Not Due       Topic Last Completion Date    DEXA Scan 09/29/2020    Colorectal Cancer Screening 04/05/2022    Lipid Panel 09/06/2022      The patient's Health Maintenance was reviewed and the following appears to be due at this time:   Health Maintenance Due   Topic Date Due    Hepatitis C Screening  Never done    TETANUS VACCINE  Never done    Shingles Vaccine (1 of 2) Never done    Influenza Vaccine (1) Never done       Advance Care Planning   I attest to discussing Advance Care Planning with patient and/or family member.  Education was provided including the importance of the Health Care Power of , Advance Directives, and/or [place] documentation.  The patient expressed understanding to the importance of this information and discussion.         No follow-ups on file. In addition to their scheduled follow up, the patient has also been instructed to follow up on as needed basis.

## 2022-09-14 ENCOUNTER — DOCUMENTATION ONLY (OUTPATIENT)
Dept: ADMINISTRATIVE | Facility: HOSPITAL | Age: 77
End: 2022-09-14
Payer: MEDICARE

## 2022-09-15 ENCOUNTER — DOCUMENTATION ONLY (OUTPATIENT)
Dept: ADMINISTRATIVE | Facility: HOSPITAL | Age: 77
End: 2022-09-15
Payer: MEDICARE

## 2022-09-15 PROBLEM — I10 HYPERTENSION: Status: ACTIVE | Noted: 2022-09-15

## 2022-09-15 PROBLEM — K21.9 GERD (GASTROESOPHAGEAL REFLUX DISEASE): Status: ACTIVE | Noted: 2022-09-15

## 2022-09-15 PROBLEM — J30.2 SEASONAL ALLERGIC RHINITIS: Status: ACTIVE | Noted: 2017-04-04

## 2022-09-15 PROBLEM — M19.90 OSTEOARTHRITIS: Status: ACTIVE | Noted: 2022-09-15

## 2022-09-15 PROBLEM — R60.0 PERIPHERAL EDEMA: Status: ACTIVE | Noted: 2019-06-13

## 2022-09-15 PROBLEM — D64.9 ANEMIA: Status: ACTIVE | Noted: 2022-09-15

## 2022-09-15 PROBLEM — Q78.2 OSTEOPETROSIS: Status: ACTIVE | Noted: 2022-09-15

## 2022-09-26 ENCOUNTER — DOCUMENTATION ONLY (OUTPATIENT)
Dept: ADMINISTRATIVE | Facility: HOSPITAL | Age: 77
End: 2022-09-26
Payer: MEDICARE

## 2022-11-17 ENCOUNTER — TELEPHONE (OUTPATIENT)
Dept: INTERNAL MEDICINE | Facility: CLINIC | Age: 77
End: 2022-11-17
Payer: MEDICARE

## 2022-11-17 NOTE — TELEPHONE ENCOUNTER
----- Message from Arina Tripathi sent at 11/17/2022 10:24 AM CST -----  Ms Walker called to inquire about taking a flu shot. She's never taken one before and feels she should this year. Has had an allergy to eggs in the past, caused wheezing in her lungs. It's been a while, should she have allergy test done before taking the flu shot  Please advise [phone]

## 2022-11-17 NOTE — TELEPHONE ENCOUNTER
Arina Tripathi sent to Odilia Whitaker MA  Caller: Unspecified (Today, 10:24 AM)  Per Dr Forte advised patient not to take the Flu shot. She will call when she's ready to do allergy testing

## 2022-12-16 DIAGNOSIS — I10 HYPERTENSION, UNSPECIFIED TYPE: ICD-10-CM

## 2022-12-16 DIAGNOSIS — I10 HYPERTENSION, UNSPECIFIED TYPE: Primary | ICD-10-CM

## 2022-12-16 RX ORDER — CLONAZEPAM 0.5 MG/1
0.5 TABLET ORAL 2 TIMES DAILY WITH MEALS
Qty: 10 TABLET | Refills: 0 | Status: SHIPPED | OUTPATIENT
Start: 2022-12-16 | End: 2022-12-19

## 2022-12-16 RX ORDER — CLONAZEPAM 0.5 MG/1
0.5 TABLET ORAL 2 TIMES DAILY WITH MEALS
Qty: 10 TABLET | Refills: 0 | Status: SHIPPED | OUTPATIENT
Start: 2022-12-16 | End: 2022-12-16 | Stop reason: SDUPTHER

## 2022-12-16 NOTE — TELEPHONE ENCOUNTER
Dr. Forte out of the office today, would you be ok with filling a few until he returns?    ----- Message from Rosalinda Vazquez sent at 12/16/2022 10:19 AM CST -----  Regarding: med  Pt is req refill of clonapin (completely out)

## 2023-01-31 ENCOUNTER — TELEPHONE (OUTPATIENT)
Dept: ADMINISTRATIVE | Facility: HOSPITAL | Age: 78
End: 2023-01-31
Payer: MEDICARE

## 2023-01-31 DIAGNOSIS — M67.40 GANGLION CYST: Primary | ICD-10-CM

## 2023-02-09 ENCOUNTER — TELEPHONE (OUTPATIENT)
Dept: INTERNAL MEDICINE | Facility: CLINIC | Age: 78
End: 2023-02-09
Payer: MEDICARE

## 2023-02-09 DIAGNOSIS — I10 HYPERTENSION, UNSPECIFIED TYPE: Primary | ICD-10-CM

## 2023-02-09 RX ORDER — RAMIPRIL 10 MG/1
10 CAPSULE ORAL 2 TIMES DAILY
Qty: 60 CAPSULE | Refills: 6 | Status: SHIPPED | OUTPATIENT
Start: 2023-02-09 | End: 2023-02-13

## 2023-02-09 NOTE — TELEPHONE ENCOUNTER
Per Dr. Forte patient can take 2 Ramapril's tonight and then start tomorrow taking 1 morning and 1 evening. Patient advised and verbalized understanding

## 2023-02-13 ENCOUNTER — TELEPHONE (OUTPATIENT)
Dept: INTERNAL MEDICINE | Facility: CLINIC | Age: 78
End: 2023-02-13
Payer: MEDICARE

## 2023-02-13 DIAGNOSIS — I10 HYPERTENSION, UNSPECIFIED TYPE: Primary | ICD-10-CM

## 2023-02-13 RX ORDER — RAMIPRIL 5 MG/1
5 CAPSULE ORAL 2 TIMES DAILY
Qty: 60 CAPSULE | Refills: 6
Start: 2023-02-13 | End: 2023-09-19

## 2023-02-13 NOTE — TELEPHONE ENCOUNTER
Spoke to patient apologized for the confusion. Over the weekend the patient took Ramipril 5mg 1 am and 1 pm, Saturday /81 (am) 121/70 (pm), Sunday 156/86, this morning 153/57. Rx fixed at the pharmacy she will continue to monitor and call on Thursday with more readings. She wants to check it bid to get a better idea of what it is running.

## 2023-03-06 DIAGNOSIS — E78.5 HYPERLIPIDEMIA, UNSPECIFIED HYPERLIPIDEMIA TYPE: ICD-10-CM

## 2023-03-06 DIAGNOSIS — I10 HYPERTENSION, UNSPECIFIED TYPE: Primary | ICD-10-CM

## 2023-03-08 ENCOUNTER — LAB VISIT (OUTPATIENT)
Dept: LAB | Facility: HOSPITAL | Age: 78
End: 2023-03-08
Attending: INTERNAL MEDICINE
Payer: MEDICARE

## 2023-03-08 DIAGNOSIS — E78.5 HYPERLIPIDEMIA, UNSPECIFIED HYPERLIPIDEMIA TYPE: ICD-10-CM

## 2023-03-08 DIAGNOSIS — I10 HYPERTENSION, UNSPECIFIED TYPE: ICD-10-CM

## 2023-03-08 LAB
ALBUMIN SERPL-MCNC: 4 G/DL (ref 3.4–4.8)
ALBUMIN/GLOB SERPL: 1.5 RATIO (ref 1.1–2)
ALP SERPL-CCNC: 52 UNIT/L (ref 40–150)
ALT SERPL-CCNC: 9 UNIT/L (ref 0–55)
APPEARANCE UR: ABNORMAL
AST SERPL-CCNC: 21 UNIT/L (ref 5–34)
BACTERIA #/AREA URNS AUTO: NORMAL /HPF
BASOPHILS # BLD AUTO: 0.04 X10(3)/MCL (ref 0–0.2)
BASOPHILS NFR BLD AUTO: 0.7 %
BILIRUB UR QL STRIP.AUTO: NEGATIVE MG/DL
BILIRUBIN DIRECT+TOT PNL SERPL-MCNC: 0.6 MG/DL
BUN SERPL-MCNC: 18.8 MG/DL (ref 9.8–20.1)
CALCIUM SERPL-MCNC: 9.2 MG/DL (ref 8.4–10.2)
CHLORIDE SERPL-SCNC: 107 MMOL/L (ref 98–107)
CO2 SERPL-SCNC: 27 MMOL/L (ref 23–31)
COLOR UR AUTO: YELLOW
CREAT SERPL-MCNC: 0.81 MG/DL (ref 0.55–1.02)
EOSINOPHIL # BLD AUTO: 0.11 X10(3)/MCL (ref 0–0.9)
EOSINOPHIL NFR BLD AUTO: 2 %
ERYTHROCYTE [DISTWIDTH] IN BLOOD BY AUTOMATED COUNT: 13.4 % (ref 11.5–17)
GFR SERPLBLD CREATININE-BSD FMLA CKD-EPI: >60 MLS/MIN/1.73/M2
GLOBULIN SER-MCNC: 2.6 GM/DL (ref 2.4–3.5)
GLUCOSE SERPL-MCNC: 82 MG/DL (ref 82–115)
GLUCOSE UR QL STRIP.AUTO: NEGATIVE MG/DL
HCT VFR BLD AUTO: 36.3 % (ref 37–47)
HGB BLD-MCNC: 11.1 G/DL (ref 12–16)
IMM GRANULOCYTES # BLD AUTO: 0.01 X10(3)/MCL (ref 0–0.04)
IMM GRANULOCYTES NFR BLD AUTO: 0.2 %
KETONES UR QL STRIP.AUTO: NEGATIVE MG/DL
LEUKOCYTE ESTERASE UR QL STRIP.AUTO: NEGATIVE UNIT/L
LYMPHOCYTES # BLD AUTO: 2.05 X10(3)/MCL (ref 0.6–4.6)
LYMPHOCYTES NFR BLD AUTO: 36.3 %
MCH RBC QN AUTO: 29.4 PG
MCHC RBC AUTO-ENTMCNC: 30.6 G/DL (ref 33–36)
MCV RBC AUTO: 96.3 FL (ref 80–94)
MONOCYTES # BLD AUTO: 0.47 X10(3)/MCL (ref 0.1–1.3)
MONOCYTES NFR BLD AUTO: 8.3 %
NEUTROPHILS # BLD AUTO: 2.96 X10(3)/MCL (ref 2.1–9.2)
NEUTROPHILS NFR BLD AUTO: 52.5 %
NITRITE UR QL STRIP.AUTO: NEGATIVE
NRBC BLD AUTO-RTO: 0 %
PH UR STRIP.AUTO: 7.5 [PH]
PLATELET # BLD AUTO: 210 X10(3)/MCL (ref 130–400)
PMV BLD AUTO: 10 FL (ref 7.4–10.4)
POTASSIUM SERPL-SCNC: 4.3 MMOL/L (ref 3.5–5.1)
PROT SERPL-MCNC: 6.6 GM/DL (ref 5.8–7.6)
PROT UR QL STRIP.AUTO: NEGATIVE MG/DL
RBC # BLD AUTO: 3.77 X10(6)/MCL (ref 4.2–5.4)
RBC #/AREA URNS AUTO: <5 /HPF
RBC UR QL AUTO: NEGATIVE UNIT/L
SODIUM SERPL-SCNC: 143 MMOL/L (ref 136–145)
SP GR UR STRIP.AUTO: 1.02 (ref 1–1.03)
SQUAMOUS #/AREA URNS AUTO: <5 /HPF
TSH SERPL-ACNC: 2.31 UIU/ML (ref 0.35–4.94)
UROBILINOGEN UR STRIP-ACNC: 0.2 MG/DL
WBC # SPEC AUTO: 5.6 X10(3)/MCL (ref 4.5–11.5)
WBC #/AREA URNS AUTO: <5 /HPF

## 2023-03-08 PROCEDURE — 85025 COMPLETE CBC W/AUTO DIFF WBC: CPT

## 2023-03-08 PROCEDURE — 81001 URINALYSIS AUTO W/SCOPE: CPT

## 2023-03-08 PROCEDURE — 84443 ASSAY THYROID STIM HORMONE: CPT

## 2023-03-08 PROCEDURE — 36415 COLL VENOUS BLD VENIPUNCTURE: CPT

## 2023-03-08 PROCEDURE — 80053 COMPREHEN METABOLIC PANEL: CPT

## 2023-03-09 ENCOUNTER — TELEPHONE (OUTPATIENT)
Dept: ADMINISTRATIVE | Facility: HOSPITAL | Age: 78
End: 2023-03-09
Payer: MEDICARE

## 2023-03-09 DIAGNOSIS — I10 PRIMARY HYPERTENSION: Primary | ICD-10-CM

## 2023-03-13 ENCOUNTER — OFFICE VISIT (OUTPATIENT)
Dept: INTERNAL MEDICINE | Facility: CLINIC | Age: 78
End: 2023-03-13
Payer: MEDICARE

## 2023-03-13 VITALS
HEIGHT: 61 IN | OXYGEN SATURATION: 97 % | HEART RATE: 85 BPM | DIASTOLIC BLOOD PRESSURE: 80 MMHG | BODY MASS INDEX: 22.66 KG/M2 | SYSTOLIC BLOOD PRESSURE: 130 MMHG | WEIGHT: 120 LBS

## 2023-03-13 DIAGNOSIS — I10 PRIMARY HYPERTENSION: ICD-10-CM

## 2023-03-13 DIAGNOSIS — E78.5 HYPERLIPIDEMIA, UNSPECIFIED HYPERLIPIDEMIA TYPE: ICD-10-CM

## 2023-03-13 DIAGNOSIS — Q78.2 OSTEOPETROSIS: ICD-10-CM

## 2023-03-13 DIAGNOSIS — K21.9 GASTROESOPHAGEAL REFLUX DISEASE WITHOUT ESOPHAGITIS: Primary | ICD-10-CM

## 2023-03-13 PROBLEM — F41.9 ANXIETY: Status: ACTIVE | Noted: 2023-03-13

## 2023-03-13 PROCEDURE — 99214 PR OFFICE/OUTPT VISIT, EST, LEVL IV, 30-39 MIN: ICD-10-PCS | Mod: ,,, | Performed by: INTERNAL MEDICINE

## 2023-03-13 PROCEDURE — 99214 OFFICE O/P EST MOD 30 MIN: CPT | Mod: ,,, | Performed by: INTERNAL MEDICINE

## 2023-03-13 RX ORDER — DEXAMETHASONE 4 MG/1
TABLET ORAL
COMMUNITY
Start: 2023-01-04

## 2023-03-13 RX ORDER — ASCORBIC ACID 500 MG
500 TABLET ORAL
COMMUNITY

## 2023-03-13 RX ORDER — CYANOCOBALAMIN (VITAMIN B-12) 500 MCG
1 TABLET ORAL EVERY MORNING
COMMUNITY
End: 2023-03-13

## 2023-03-13 RX ORDER — AZELASTINE 1 MG/ML
1 SPRAY, METERED NASAL
COMMUNITY
End: 2023-08-07

## 2023-03-13 RX ORDER — ALBUTEROL SULFATE 90 UG/1
AEROSOL, METERED RESPIRATORY (INHALATION)
COMMUNITY
Start: 2023-01-03

## 2023-03-13 RX ORDER — MONTELUKAST SODIUM 10 MG/1
10 TABLET ORAL NIGHTLY
COMMUNITY
Start: 2022-12-06 | End: 2023-08-07 | Stop reason: SDUPTHER

## 2023-03-13 RX ORDER — CLONAZEPAM 0.5 MG/1
0.5 TABLET ORAL NIGHTLY
Qty: 30 TABLET | Refills: 0 | Status: SHIPPED | OUTPATIENT
Start: 2023-03-13 | End: 2023-04-14 | Stop reason: SDUPTHER

## 2023-03-13 RX ORDER — CALCIUM CARBONATE 600 MG
600 TABLET ORAL
COMMUNITY

## 2023-03-13 NOTE — PROGRESS NOTES
Subjective:      Patient ID: Denise Reece is a 77 y.o. female.    Chief Complaint: Follow-up (6 MONTH)      HPI:This patient is an established patient. Her active problem list and current medication listed in her chart will be reviewed at the time of this visit. This patient's medical illnesses have been well recognized and documented in her chart. A review of systems will be taken at the time of this visit and any new information necessary for her care will be documented. She has done well since her last visit to the office. She has been compliant in taking medication, and refilling her medication on a regular schedule. She had laboratory studies drawn prior to her office visit, and I took the liberty to review these results in detail with her. I have given her a hand written explanation of the results for her records.       The patient's Health Maintenance was reviewed and the following appears to be due at this time:   Health Maintenance Due   Topic Date Due    Hepatitis C Screening  Never done    Shingles Vaccine (1 of 2) Never done    TETANUS VACCINE  11/14/2021    Influenza Vaccine (1) Never done        Recent Labwork  Lab Visit on 03/08/2023   Component Date Value Ref Range Status    Sodium Level 03/08/2023 143  136 - 145 mmol/L Final    Potassium Level 03/08/2023 4.3  3.5 - 5.1 mmol/L Final    Chloride 03/08/2023 107  98 - 107 mmol/L Final    Carbon Dioxide 03/08/2023 27  23 - 31 mmol/L Final    Glucose Level 03/08/2023 82  82 - 115 mg/dL Final    Blood Urea Nitrogen 03/08/2023 18.8  9.8 - 20.1 mg/dL Final    Creatinine 03/08/2023 0.81  0.55 - 1.02 mg/dL Final    Calcium Level Total 03/08/2023 9.2  8.4 - 10.2 mg/dL Final    Protein Total 03/08/2023 6.6  5.8 - 7.6 gm/dL Final    Albumin Level 03/08/2023 4.0  3.4 - 4.8 g/dL Final    Globulin 03/08/2023 2.6  2.4 - 3.5 gm/dL Final    Albumin/Globulin Ratio 03/08/2023 1.5  1.1 - 2.0 ratio Final    Bilirubin Total 03/08/2023 0.6  <=1.5 mg/dL Final     Alkaline Phosphatase 03/08/2023 52  40 - 150 unit/L Final    Alanine Aminotransferase 03/08/2023 9  0 - 55 unit/L Final    Aspartate Aminotransferase 03/08/2023 21  5 - 34 unit/L Final    eGFR 03/08/2023 >60  mls/min/1.73/m2 Final    Thyroid Stimulating Hormone 03/08/2023 2.315  0.350 - 4.940 uIU/mL Final    Color, UA 03/08/2023 Yellow  Yellow, Light-Yellow, Dark Yellow, Payal, Straw Final    Appearance, UA 03/08/2023 Cloudy (A)  Clear Final    Specific Gravity, UA 03/08/2023 1.020  1.005 - 1.030 Final    pH, UA 03/08/2023 7.5  5.0 - 8.5 Final    Protein, UA 03/08/2023 Negative  Negative mg/dL Final    Glucose, UA 03/08/2023 Negative  Negative, Normal mg/dL Final    Ketones, UA 03/08/2023 Negative  Negative mg/dL Final    Blood, UA 03/08/2023 Negative  Negative unit/L Final    Bilirubin, UA 03/08/2023 Negative  Negative mg/dL Final    Urobilinogen, UA 03/08/2023 0.2  0.2, 1.0, Normal mg/dL Final    Nitrites, UA 03/08/2023 Negative  Negative Final    Leukocyte Esterase, UA 03/08/2023 Negative  Negative unit/L Final    WBC 03/08/2023 5.6  4.5 - 11.5 x10(3)/mcL Final    RBC 03/08/2023 3.77 (L)  4.20 - 5.40 x10(6)/mcL Final    Hgb 03/08/2023 11.1 (L)  12.0 - 16.0 g/dL Final    Hct 03/08/2023 36.3 (L)  37.0 - 47.0 % Final    MCV 03/08/2023 96.3 (H)  80.0 - 94.0 fL Final    MCH 03/08/2023 29.4  pg Final    MCHC 03/08/2023 30.6 (L)  33.0 - 36.0 g/dL Final    RDW 03/08/2023 13.4  11.5 - 17.0 % Final    Platelet 03/08/2023 210  130 - 400 x10(3)/mcL Final    MPV 03/08/2023 10.0  7.4 - 10.4 fL Final    Neut % 03/08/2023 52.5  % Final    Lymph % 03/08/2023 36.3  % Final    Mono % 03/08/2023 8.3  % Final    Eos % 03/08/2023 2.0  % Final    Basophil % 03/08/2023 0.7  % Final    Lymph # 03/08/2023 2.05  0.6 - 4.6 x10(3)/mcL Final    Neut # 03/08/2023 2.96  2.1 - 9.2 x10(3)/mcL Final    Mono # 03/08/2023 0.47  0.1 - 1.3 x10(3)/mcL Final    Eos # 03/08/2023 0.11  0 - 0.9 x10(3)/mcL Final    Baso # 03/08/2023 0.04  0 - 0.2 x10(3)/mcL  Final    IG# 03/08/2023 0.01  0 - 0.04 x10(3)/mcL Final    IG% 03/08/2023 0.2  % Final    NRBC% 03/08/2023 0.0  % Final    RBC, UA 03/08/2023 <5  <=5 /HPF Final    WBC, UA 03/08/2023 <5  <=5 /HPF Final    Squamous Epithelial Cells, UA 03/08/2023 <5  <=5 /HPF Final    Bacteria, UA 03/08/2023 None Seen  None Seen, Rare, Occasional /HPF Final       Past Medical History:  Past Medical History:   Diagnosis Date    Allergy     Anemia     Anxiety     GERD (gastroesophageal reflux disease)     Hyperlipidemia     Hypertension     Osteoporosis     Personal history of colonic polyps     Personal history of colonic polyps 04/05/2022     Past Surgical History:   Procedure Laterality Date    BREAST LUMPECTOMY      COLONOSCOPY  04/05/2022    COLONOSCOPY  04/05/2022     Review of patient's allergies indicates:   Allergen Reactions    Benadryl allergy decongestant      Current Outpatient Medications on File Prior to Visit   Medication Sig Dispense Refill    albuterol (PROVENTIL/VENTOLIN HFA) 90 mcg/actuation inhaler INHALE 2 PUFFS INTO LUNGS EVERY 6 HOURS AS NEEDED FOR WHEEZING FOR UP  DAYS      ascorbic acid, vitamin C, (VITAMIN C) 500 MG tablet Take 500 mg by mouth.      ascorbic acid-bioflavonoids 200-300 mg Tab Take by mouth.      azelastine (ASTELIN) 137 mcg (0.1 %) nasal spray 1 spray by Nasal route.      calcium carbonate (OS-JAE) 600 mg calcium (1,500 mg) Tab Take 600 mg by mouth.      calcium carbonate-vitamin D3 (CALCIUM 600 WITH VITAMIN D3) 600 mg-10 mcg (400 unit) Chew Take by mouth.      carboxymethylcellulose (REFRESH PLUS) 0.5 % Dpet 1 drop 3 (three) times daily as needed.      cholecalciferol, vitamin D3, (VITAMIN D3) 10 mcg (400 unit) Tab Take 1 tablet by mouth every morning.      dicyclomine (BENTYL) 10 MG capsule Take 1 tablet every 4-6 hours prn abdominal pain 25 capsule 0    famotidine (PEPCID) 20 MG tablet Take 1 tablet (20 mg total) by mouth 2 (two) times daily. for 10 days 20 tablet 0    FLOVENT   "mcg/actuation inhaler INHALE 1 PUFF BY MOUTH IN THE MORNING AND 1 AT BEDTIME      montelukast (SINGULAIR) 10 mg tablet Take 10 mg by mouth every evening.      ramipriL (ALTACE) 5 MG capsule Take 1 capsule (5 mg total) by mouth 2 (two) times daily. 60 capsule 6    rosuvastatin (CRESTOR) 10 MG tablet Take 1 tablet by mouth once daily 90 tablet 2    triamcinolone (NASACORT) 55 mcg nasal inhaler 2 sprays by Nasal route once daily.      [DISCONTINUED] clonazePAM (KLONOPIN) 0.5 MG tablet Take 1 tablet by mouth in the evening 30 tablet 0    [DISCONTINUED] benzonatate (TESSALON) 100 MG capsule Take 1 capsule by mouth three times daily as needed for cough 21 capsule 0     No current facility-administered medications on file prior to visit.     Social History     Socioeconomic History    Marital status:    Tobacco Use    Smoking status: Never    Smokeless tobacco: Never   Substance and Sexual Activity    Alcohol use: Never    Drug use: Never    Sexual activity: Not Currently     History reviewed. No pertinent family history.    Review of Systems  Review of Systems   Constitutional: Negative.    HENT: Negative.    Eyes: Negative.    Respiratory: Negative.    Cardiovascular: Negative.    Gastrointestinal: Negative.    Genitourinary: Negative.    Musculoskeletal: Negative.    Neurological: Negative.    Endo/Heme/Allergies: Negative.    Psychiatric/Behavioral: Negative.    Objective:   /80   Pulse 85   Ht 5' 1" (1.549 m)   Wt 54.4 kg (120 lb)   SpO2 97%   BMI 22.67 kg/m²         Physical Exam  Vitals and nursing note reviewed.   Constitutional:       General: He is not in acute distress.     Appearance: Normal appearance.   HENT:      Head: Normocephalic and atraumatic.      Right Ear: Tympanic membrane and ear canal normal.      Left Ear: Tympanic membrane and ear canal normal.      Nose: Nose normal.      Mouth/Throat:      Pharynx: Oropharynx is clear. No oropharyngeal exudate or posterior oropharyngeal " erythema.   Eyes:      Extraocular Movements: Extraocular movements intact.      Pupils: Pupils are equal, round, and reactive to light.   Cardiovascular:      Rate and Rhythm: Normal rate and regular rhythm.      Pulses: Normal pulses.      Heart sounds: Normal heart sounds. No murmur heard.    No friction rub. No gallop.   Pulmonary:      Effort: Pulmonary effort is normal. No respiratory distress.      Breath sounds: Normal breath sounds.   Abdominal:      General: Abdomen is flat. Bowel sounds are normal.      Palpations: Abdomen is soft.   Genitourinary:     Rectum: Normal.   Musculoskeletal:         General: Normal range of motion.      Cervical back: Normal range of motion and neck supple.   Skin:     General: Skin is warm.      Capillary Refill: Capillary refill takes less than 2 seconds.   Neurological:      General: No focal deficit present.      Mental Status: He is alert and oriented to person, place, and time.   Psychiatric:         Mood and Affect: Mood normal.         Behavior: Behavior normal.         Thought Content: Thought content normal.         Judgment: Judgment normal.   Assessment:     1. Gastroesophageal reflux disease without esophagitis    2. Primary hypertension    3. Osteopetrosis    4. Hyperlipidemia, unspecified hyperlipidemia type      Plan:   I have changed Denise Reece's clonazePAM. I am also having her maintain her dicyclomine, famotidine, carboxymethylcellulose, triamcinolone, calcium carbonate-vitamin D3, ascorbic acid-bioflavonoids, rosuvastatin, ramipriL, albuterol, ascorbic acid (vitamin C), azelastine, calcium carbonate, cholecalciferol (vitamin D3), FLOVENT HFA, and montelukast.This patient is an established patient who has come in for an examination. She has done very well since her last visit to the office. She has a negative review of systems, except as mentioned above. She has not had any new problems to develop in the recent past. I was able to review her  laboratory studies with her completely, as mentioned in the history of present illness. I will make medication changes as necessary, and her follow-up will continue as before.  Fortunately, will not need to make any medication changes, but I am anxious to see her improve meta to.  She does not have the energy that I would like to see her have, and I would recommend beginning an exercise program, to see how she handles this.    Problem List Items Addressed This Visit          Cardiac/Vascular    Hypertension       GI    GERD (gastroesophageal reflux disease) - Primary       Orthopedic    Osteopetrosis     Other Visit Diagnoses       Hyperlipidemia, unspecified hyperlipidemia type        Relevant Orders    Lipid Panel            Denise was seen today for follow-up.    Diagnoses and all orders for this visit:    Gastroesophageal reflux disease without esophagitis    Primary hypertension    Osteopetrosis    Hyperlipidemia, unspecified hyperlipidemia type  -     Lipid Panel; Future    Other orders  -     clonazePAM (KLONOPIN) 0.5 MG tablet; Take 1 tablet (0.5 mg total) by mouth every evening.

## 2023-03-16 ENCOUNTER — LAB VISIT (OUTPATIENT)
Dept: LAB | Facility: HOSPITAL | Age: 78
End: 2023-03-16
Attending: INTERNAL MEDICINE
Payer: MEDICARE

## 2023-03-16 DIAGNOSIS — E78.5 HYPERLIPIDEMIA, UNSPECIFIED HYPERLIPIDEMIA TYPE: ICD-10-CM

## 2023-03-16 LAB
CHOLEST SERPL-MCNC: 182 MG/DL
CHOLEST/HDLC SERPL: 2 {RATIO} (ref 0–5)
HDLC SERPL-MCNC: 87 MG/DL (ref 35–60)
LDLC SERPL CALC-MCNC: 86 MG/DL (ref 50–140)
TRIGL SERPL-MCNC: 43 MG/DL (ref 37–140)
VLDLC SERPL CALC-MCNC: 9 MG/DL

## 2023-03-16 PROCEDURE — 80061 LIPID PANEL: CPT

## 2023-03-16 PROCEDURE — 36415 COLL VENOUS BLD VENIPUNCTURE: CPT

## 2023-03-27 RX ORDER — CLONAZEPAM 0.5 MG/1
0.5 TABLET ORAL NIGHTLY
Qty: 30 TABLET | Refills: 0 | Status: SHIPPED | OUTPATIENT
Start: 2023-03-27 | End: 2023-10-10 | Stop reason: SDUPTHER

## 2023-04-14 ENCOUNTER — OFFICE VISIT (OUTPATIENT)
Dept: FAMILY MEDICINE | Facility: CLINIC | Age: 78
End: 2023-04-14
Payer: MEDICARE

## 2023-04-14 VITALS
DIASTOLIC BLOOD PRESSURE: 83 MMHG | OXYGEN SATURATION: 98 % | WEIGHT: 121.81 LBS | SYSTOLIC BLOOD PRESSURE: 135 MMHG | HEIGHT: 61 IN | BODY MASS INDEX: 23 KG/M2 | HEART RATE: 73 BPM

## 2023-04-14 DIAGNOSIS — M79.605 LEFT LEG PAIN: Primary | ICD-10-CM

## 2023-04-14 PROCEDURE — 99214 OFFICE O/P EST MOD 30 MIN: CPT | Mod: ,,, | Performed by: NURSE PRACTITIONER

## 2023-04-14 PROCEDURE — 99214 PR OFFICE/OUTPT VISIT, EST, LEVL IV, 30-39 MIN: ICD-10-PCS | Mod: ,,, | Performed by: NURSE PRACTITIONER

## 2023-04-14 RX ORDER — TIZANIDINE 2 MG/1
4 TABLET ORAL NIGHTLY PRN
Qty: 20 TABLET | Refills: 0 | Status: SHIPPED | OUTPATIENT
Start: 2023-04-14

## 2023-04-14 RX ORDER — NAPROXEN SODIUM 220 MG/1
1 TABLET, FILM COATED ORAL EVERY MORNING
COMMUNITY
End: 2024-03-20

## 2023-04-14 RX ORDER — DICLOFENAC SODIUM 75 MG/1
75 TABLET, DELAYED RELEASE ORAL 2 TIMES DAILY
COMMUNITY
Start: 2023-04-12

## 2023-04-14 NOTE — ASSESSMENT & PLAN NOTE
Unchanged  Informed to start NSAID as prescribed; may take with PPI (states hx of upset stomach with NSAIDS; No Hx of ulcer)  Rx Zanflex prn  Pt requests US: US arterial BLE ordered; will call with results (Dr. Cardiologist is Dr. Tran)  DVT on differentials, but we did discuss; states we can defer venous US  Instructed to continue to monitor symptoms  Report to ED for any peripheral edema, calf pain/erythema, warm/hot skin, CP, SOB, and/or worsening symptoms  Keep appt with PCP for follow up  Pt is agreeable to plan and verbalizes understanding

## 2023-04-14 NOTE — PROGRESS NOTES
"Subjective:      Patient ID: Denise Reece is a 77 y.o. Black or  female      Chief Complaint: Follow-up (Went to urgent care and they stated she strained her left quad and calf, was told to f/u if not better. )       Past Medical History:   Diagnosis Date    Allergy     Anemia     Anxiety     GERD (gastroesophageal reflux disease)     Hyperlipidemia     Hypertension     Osteoporosis     Personal history of colonic polyps     Personal history of colonic polyps 04/05/2022        HPI  Presents to clinic for Urgent Care Follow up.    [place] [facility] [place]12/[facility]23  with complaints of left leg/calf pain; worse with activity, radiating to thigh area now. Started after strenuous work the weekend prior.  Denies any falls or injuries.  Diagnosed with strain of left quadriceps muscle, fascia and tendon.  Rx given for NSAID. Overall, symptoms not improved.  Pain is in left, lower leg; left anterior thigh, and left hip.  Pain is intermittent, occurs when ambulating.  Described as "spasms" this morning.  She did not start NSAID.  Denies any other problems.          Review of Systems   Constitutional: Negative.  Negative for appetite change, chills and fever.   HENT: Negative.     Eyes: Negative.  Negative for discharge and redness.   Respiratory: Negative.  Negative for shortness of breath.    Cardiovascular: Negative.  Negative for chest pain.   Gastrointestinal: Negative.    Endocrine: Negative.    Genitourinary: Negative.    Musculoskeletal:         Left leg pain   Integumentary:  Negative.   Allergic/Immunologic: Negative.    Neurological: Negative.    Psychiatric/Behavioral: Negative.     All other systems reviewed and are negative.       Objective:      Vitals:    04/14/23 0918   BP: 135/83   Pulse: 73      Body mass index is 23.01 kg/m².     Physical Exam  Vitals reviewed.   Constitutional:       Appearance: Normal appearance.   HENT:      Head: Normocephalic.      Mouth/Throat:      Mouth: " Mucous membranes are moist.   Eyes:      Conjunctiva/sclera: Conjunctivae normal.      Pupils: Pupils are equal, round, and reactive to light.   Cardiovascular:      Rate and Rhythm: Normal rate and regular rhythm.   Pulmonary:      Effort: Pulmonary effort is normal. No respiratory distress.      Breath sounds: Normal breath sounds.   Musculoskeletal:         General: Normal range of motion.      Cervical back: Normal range of motion and neck supple.      Comments: LLE noted; no edema; no tenderness; negative Man's sign   Skin:     General: Skin is warm and dry.   Neurological:      Mental Status: She is alert and oriented to person, place, and time.   Psychiatric:         Mood and Affect: Mood normal.          Current Outpatient Medications:     albuterol (PROVENTIL/VENTOLIN HFA) 90 mcg/actuation inhaler, INHALE 2 PUFFS INTO LUNGS EVERY 6 HOURS AS NEEDED FOR WHEEZING FOR UP  DAYS, Disp: , Rfl:     ascorbic acid, vitamin C, (VITAMIN C) 500 MG tablet, Take 500 mg by mouth., Disp: , Rfl:     aspirin 81 MG Chew, Take 1 tablet by mouth every morning., Disp: , Rfl:     azelastine (ASTELIN) 137 mcg (0.1 %) nasal spray, 1 spray by Nasal route., Disp: , Rfl:     calcium carbonate (OS-JAE) 600 mg calcium (1,500 mg) Tab, Take 600 mg by mouth., Disp: , Rfl:     carboxymethylcellulose (REFRESH PLUS) 0.5 % Dpet, 1 drop 3 (three) times daily as needed., Disp: , Rfl:     clonazePAM (KLONOPIN) 0.5 MG tablet, Take 1 tablet (0.5 mg total) by mouth every evening., Disp: 30 tablet, Rfl: 0    diclofenac (VOLTAREN) 75 MG EC tablet, Take 75 mg by mouth 2 (two) times daily., Disp: , Rfl:     famotidine (PEPCID) 20 MG tablet, Take 1 tablet (20 mg total) by mouth 2 (two) times daily. for 10 days, Disp: 20 tablet, Rfl: 0    FLOVENT  mcg/actuation inhaler, INHALE 1 PUFF BY MOUTH IN THE MORNING AND 1 AT BEDTIME, Disp: , Rfl:     ramipriL (ALTACE) 5 MG capsule, Take 1 capsule (5 mg total) by mouth 2 (two) times daily., Disp: 60  capsule, Rfl: 6    rosuvastatin (CRESTOR) 10 MG tablet, Take 1 tablet by mouth once daily, Disp: 90 tablet, Rfl: 2    triamcinolone (NASACORT) 55 mcg nasal inhaler, 2 sprays by Nasal route once daily., Disp: , Rfl:     dicyclomine (BENTYL) 10 MG capsule, Take 1 tablet every 4-6 hours prn abdominal pain (Patient not taking: Reported on 4/14/2023), Disp: 25 capsule, Rfl: 0    montelukast (SINGULAIR) 10 mg tablet, Take 10 mg by mouth every evening., Disp: , Rfl:     tiZANidine (ZANAFLEX) 2 MG tablet, Take 2 tablets (4 mg total) by mouth nightly as needed (msucle pain)., Disp: 20 tablet, Rfl: 0    Assessment & Plan:     Problem List Items Addressed This Visit          Orthopedic    Left leg pain - Primary     Unchanged  Informed to start NSAID as prescribed; may take with PPI (states hx of upset stomach with NSAIDS; No Hx of ulcer)  Rx Zanflex prn  Pt requests US: US arterial BLE ordered; will call with results (Dr. Cardiologist is Dr. Tran)  DVT on differentials, but we did discuss; states we can defer venous US  Instructed to continue to monitor symptoms  Report to ED for any peripheral edema, calf pain/erythema, warm/hot skin, CP, SOB, and/or worsening symptoms  Keep appt with PCP for follow up  Pt is agreeable to plan and verbalizes understanding               Relevant Medications    tiZANidine (ZANAFLEX) 2 MG tablet    Other Relevant Orders    CV Ultrasound doppler arterial leg left            Prior to the patient's arrival on the same day, I spent (5) minutes reviewing chart. Once in the exam room with the patient, I spent (20 ) minutes in the room with the member performing a history and exam as well as reviewing the test results and recommendations with the patient. After leaving the exam room, I spent an additional (5 ) minutes completing the electronic health record. The total time spent that day caring for the member is (30 ) minutes, and this time - including the breakdown - is documented in the medical  record.

## 2023-04-17 ENCOUNTER — TELEPHONE (OUTPATIENT)
Dept: FAMILY MEDICINE | Facility: CLINIC | Age: 78
End: 2023-04-17
Payer: MEDICARE

## 2023-04-17 ENCOUNTER — TELEPHONE (OUTPATIENT)
Dept: ADMINISTRATIVE | Facility: HOSPITAL | Age: 78
End: 2023-04-17
Payer: MEDICARE

## 2023-04-17 NOTE — TELEPHONE ENCOUNTER
Patient was seen by Savannah on 4/14/2023 can she refer patient to either neurosurgeon or orthopedic? If ok and you need I can put the order in and call the patient. Thank you

## 2023-04-18 NOTE — TELEPHONE ENCOUNTER
Spoke to patient advised that a message was sent to Savannah elmore's office yesterday, number given to call their office to require status.    ----- Message from Anat Mitchell sent at 4/18/2023 10:46 AM CDT -----  .Type:  Needs Medical Advice    Who Called: pt  Symptoms (please be specific): lower back pain, left leg pain   How long has patient had these symptoms:  last week    Would the patient rather a call back or a response via [facility]? Call back   Best Call Back Number: [phone]  Additional Information: pt is requesting a referral , she went to ER last week [facility] she's needing to see  a neurosurgeon please call and advise .. please call

## 2023-04-19 ENCOUNTER — TELEPHONE (OUTPATIENT)
Dept: FAMILY MEDICINE | Facility: CLINIC | Age: 78
End: 2023-04-19
Payer: MEDICARE

## 2023-04-19 DIAGNOSIS — M79.606 PAIN OF LOWER EXTREMITY, UNSPECIFIED LATERALITY: Primary | ICD-10-CM

## 2023-04-21 PROCEDURE — G0180 MD CERTIFICATION HHA PATIENT: HCPCS | Mod: ,,, | Performed by: NURSE PRACTITIONER

## 2023-04-21 PROCEDURE — G0180 PR HOME HEALTH MD CERTIFICATION: ICD-10-PCS | Mod: ,,, | Performed by: NURSE PRACTITIONER

## 2023-05-08 ENCOUNTER — TELEPHONE (OUTPATIENT)
Dept: INTERNAL MEDICINE | Facility: CLINIC | Age: 78
End: 2023-05-08
Payer: MEDICARE

## 2023-05-08 NOTE — TELEPHONE ENCOUNTER
noted    ----- Message from Belén Dueñas sent at 5/8/2023 11:43 AM CDT -----  Regarding: message for pierre [facility] hossein  Type:  Needs Medical Advice    Who Called: pt    Would the patient rather a call back or a response via [facility]? C/b  Best Call Back Number: [phone]  Additional Information: pt wanted clinic to know she has gotten an appointment with Dr CAYLA Dixon   Thank you

## 2023-05-19 ENCOUNTER — EXTERNAL HOME HEALTH (OUTPATIENT)
Dept: HOME HEALTH SERVICES | Facility: HOSPITAL | Age: 78
End: 2023-05-19
Payer: MEDICARE

## 2023-06-20 ENCOUNTER — DOCUMENT SCAN (OUTPATIENT)
Dept: HOME HEALTH SERVICES | Facility: HOSPITAL | Age: 78
End: 2023-06-20
Payer: MEDICARE

## 2023-08-07 ENCOUNTER — OFFICE VISIT (OUTPATIENT)
Dept: INTERNAL MEDICINE | Facility: CLINIC | Age: 78
End: 2023-08-07
Payer: MEDICARE

## 2023-08-07 VITALS
DIASTOLIC BLOOD PRESSURE: 76 MMHG | HEART RATE: 82 BPM | OXYGEN SATURATION: 97 % | RESPIRATION RATE: 12 BRPM | WEIGHT: 127 LBS | HEIGHT: 61 IN | SYSTOLIC BLOOD PRESSURE: 126 MMHG | BODY MASS INDEX: 23.98 KG/M2

## 2023-08-07 DIAGNOSIS — J30.2 SEASONAL ALLERGIC RHINITIS, UNSPECIFIED TRIGGER: ICD-10-CM

## 2023-08-07 DIAGNOSIS — J98.01 BRONCHOSPASM: ICD-10-CM

## 2023-08-07 DIAGNOSIS — I10 PRIMARY HYPERTENSION: ICD-10-CM

## 2023-08-07 DIAGNOSIS — I70.0 AORTIC ATHEROSCLEROSIS: ICD-10-CM

## 2023-08-07 DIAGNOSIS — D64.9 ANEMIA, UNSPECIFIED TYPE: Primary | ICD-10-CM

## 2023-08-07 DIAGNOSIS — F41.9 ANXIETY: ICD-10-CM

## 2023-08-07 PROCEDURE — 99214 PR OFFICE/OUTPT VISIT, EST, LEVL IV, 30-39 MIN: ICD-10-PCS | Mod: ,,, | Performed by: STUDENT IN AN ORGANIZED HEALTH CARE EDUCATION/TRAINING PROGRAM

## 2023-08-07 PROCEDURE — 99214 OFFICE O/P EST MOD 30 MIN: CPT | Mod: ,,, | Performed by: STUDENT IN AN ORGANIZED HEALTH CARE EDUCATION/TRAINING PROGRAM

## 2023-08-07 RX ORDER — MONTELUKAST SODIUM 10 MG/1
10 TABLET ORAL NIGHTLY
Qty: 30 TABLET | Refills: 3 | Status: SHIPPED | OUTPATIENT
Start: 2023-08-07

## 2023-08-09 PROBLEM — I70.0 AORTIC ATHEROSCLEROSIS: Status: ACTIVE | Noted: 2023-08-09

## 2023-08-09 PROBLEM — J98.01 BRONCHOSPASM: Status: ACTIVE | Noted: 2023-08-09

## 2023-08-09 NOTE — ASSESSMENT & PLAN NOTE
On Flovent BID and albuterol as needed  Will restart montelukast daily   Advised patient to call clinic if cough does not improve

## 2023-08-09 NOTE — PROGRESS NOTES
Subjective:      Denise Reece  08/09/2023  [id number]      Chief Complaint: Establish Care (/New pt establishment)       HPI:  Ms Walker is a 78 y/o female patient who is here to establish care. Patient has hypertension, hyperlipidemia, GERD, anemia. Patient is complaining of a chronic cough, has been diagnosed with bronchospasm by Pulmonology, on Flovent and albuterol as needed, was taking montelukast but has discontinued it and states since then her cough has worsened. No other complaints.     Past Medical History:   Diagnosis Date    Allergy     Anemia     Anxiety     GERD (gastroesophageal reflux disease)     Hyperlipidemia     Hypertension     Osteoporosis     Personal history of colonic polyps     Personal history of colonic polyps 04/05/2022     Past Surgical History:   Procedure Laterality Date    BREAST LUMPECTOMY      COLONOSCOPY  04/05/2022    COLONOSCOPY  04/05/2022     History reviewed. No pertinent family history.  Social History     Tobacco Use    Smoking status: Never    Smokeless tobacco: Never   Substance and Sexual Activity    Alcohol use: Never    Drug use: Never    Sexual activity: Not Currently     Review of patient's allergies indicates:   Allergen Reactions    Benadryl allergy decongestant        The following were reviewed at this visit: active problem list, medication list, allergies, family history, social history, and health maintenance.    Medications:    Current Outpatient Medications:     albuterol (PROVENTIL/VENTOLIN HFA) 90 mcg/actuation inhaler, INHALE 2 PUFFS INTO LUNGS EVERY 6 HOURS AS NEEDED FOR WHEEZING FOR UP  DAYS, Disp: , Rfl:     ascorbic acid, vitamin C, (VITAMIN C) 500 MG tablet, Take 500 mg by mouth., Disp: , Rfl:     aspirin 81 MG Chew, Take 1 tablet by mouth every morning., Disp: , Rfl:     calcium carbonate (OS-JAE) 600 mg calcium (1,500 mg) Tab, Take 600 mg by mouth., Disp: , Rfl:     clonazePAM (KLONOPIN) 0.5 MG tablet, Take 1 tablet (0.5 mg total) by  mouth every evening., Disp: 30 tablet, Rfl: 0    diclofenac (VOLTAREN) 75 MG EC tablet, Take 75 mg by mouth 2 (two) times daily., Disp: , Rfl:     famotidine (PEPCID) 20 MG tablet, Take 1 tablet (20 mg total) by mouth 2 (two) times daily. for 10 days, Disp: 20 tablet, Rfl: 0    ramipriL (ALTACE) 5 MG capsule, Take 1 capsule (5 mg total) by mouth 2 (two) times daily., Disp: 60 capsule, Rfl: 6    rosuvastatin (CRESTOR) 10 MG tablet, Take 1 tablet by mouth once daily, Disp: 90 tablet, Rfl: 2    tiZANidine (ZANAFLEX) 2 MG tablet, Take 2 tablets (4 mg total) by mouth nightly as needed (msucle pain)., Disp: 20 tablet, Rfl: 0    triamcinolone (NASACORT) 55 mcg nasal inhaler, 2 sprays by Nasal route once daily., Disp: , Rfl:     carboxymethylcellulose (REFRESH PLUS) 0.5 % Dpet, 1 drop 3 (three) times daily as needed., Disp: , Rfl:     FLOVENT  mcg/actuation inhaler, INHALE 1 PUFF BY MOUTH IN THE MORNING AND 1 AT BEDTIME, Disp: , Rfl:     montelukast (SINGULAIR) 10 mg tablet, Take 1 tablet (10 mg total) by mouth every evening., Disp: 30 tablet, Rfl: 3      Medications have been reviewed and reconciled with patient at this visit.  Barriers to medications reviewed with patient.    Adverse reactions to current medications reviewed with patient..    Over the counter medications reviewed and reconciled with patient.  Review of Systems   Constitutional:  Negative for chills, fever, malaise/fatigue and weight loss.   HENT:  Negative for congestion, ear discharge, ear pain, hearing loss, sinus pain and sore throat.    Eyes:  Negative for photophobia, pain, discharge and redness.   Respiratory:  Positive for cough. Negative for shortness of breath and wheezing.    Cardiovascular:  Negative for chest pain, palpitations and leg swelling.   Gastrointestinal:  Negative for constipation, diarrhea, heartburn, nausea and vomiting.   Genitourinary:  Negative for dysuria, frequency and urgency.   Musculoskeletal:  Negative for falls,  "joint pain and myalgias.   Skin:  Negative for itching and rash.   Neurological:  Negative for dizziness, focal weakness, weakness and headaches.   Psychiatric/Behavioral:  Negative for depression and memory loss. The patient is not nervous/anxious and does not have insomnia.            Objective:      Vitals:    08/07/23 1300   BP: 126/76   BP Location: Left arm   Patient Position: Sitting   BP Method: Small (Manual)   Pulse: 82   Resp: 12   SpO2: 97%   Weight: 57.6 kg (127 lb)   Height: 5' 1" (1.549 m)       Physical Exam  Constitutional:       General: She is not in acute distress.     Appearance: Normal appearance.   HENT:      Head: Normocephalic and atraumatic.   Eyes:      Extraocular Movements: Extraocular movements intact.      Pupils: Pupils are equal, round, and reactive to light.   Cardiovascular:      Rate and Rhythm: Normal rate and regular rhythm.      Pulses: Normal pulses.      Heart sounds: Normal heart sounds. No murmur heard.     No friction rub. No gallop.   Pulmonary:      Effort: Pulmonary effort is normal.      Breath sounds: Normal breath sounds. No wheezing, rhonchi or rales.   Abdominal:      General: Abdomen is flat. Bowel sounds are normal. There is no distension.      Palpations: Abdomen is soft.      Tenderness: There is no abdominal tenderness.   Musculoskeletal:         General: No swelling or tenderness. Normal range of motion.      Cervical back: Normal range of motion and neck supple. No tenderness.      Right lower leg: No edema.      Left lower leg: No edema.   Lymphadenopathy:      Cervical: No cervical adenopathy.   Skin:     Findings: No lesion or rash.   Neurological:      General: No focal deficit present.      Mental Status: She is alert and oriented to person, place, and time.      Cranial Nerves: No cranial nerve deficit.      Sensory: No sensory deficit.      Motor: No weakness.   Psychiatric:         Mood and Affect: Mood normal.         Behavior: Behavior normal.    "      Thought Content: Thought content normal.               Assessment and Plan:       1. Anemia, unspecified type  Assessment & Plan:  Will check iron studies, vitamin B12, folate     Orders:  -     CBC Auto Differential; Future; Expected date: 08/07/2023  -     Iron and TIBC; Future; Expected date: 08/07/2023  -     Ferritin; Future; Expected date: 08/07/2023  -     Vitamin B12; Future; Expected date: 08/07/2023  -     Folate; Future; Expected date: 08/07/2023    2. Aortic atherosclerosis  Assessment & Plan:  Stable  On aspirin and rosuvastatin, continue       3. Anxiety  Assessment & Plan:  On clonazepam as needed  Continue       4. Seasonal allergic rhinitis, unspecified trigger  Assessment & Plan:  Stable on nasal triamcinolone  Continue       5. Primary hypertension  Assessment & Plan:  Controlled  Continue current medications       6. Bronchospasm  Assessment & Plan:  On Flovent BID and albuterol as needed  Will restart montelukast daily   Advised patient to call clinic if cough does not improve      Other orders  -     montelukast (SINGULAIR) 10 mg tablet; Take 1 tablet (10 mg total) by mouth every evening.  Dispense: 30 tablet; Refill: 3            Follow up: No follow-ups on file.

## 2023-08-11 ENCOUNTER — TELEPHONE (OUTPATIENT)
Dept: INTERNAL MEDICINE | Facility: CLINIC | Age: 78
End: 2023-08-11
Payer: MEDICARE

## 2023-08-11 NOTE — TELEPHONE ENCOUNTER
----- Message from Harriet Norsi sent at 8/11/2023  9:23 AM CDT -----  Regarding: med adv  Type:  Needs Medical Advice    Who Called: patient  Symptoms (please be specific): Congestion and dry cough    How long has patient had these symptoms:  mid week   Pharmacy name and phone #:  [facility]  Would the patient rather a call back or a response via [facility]?   Best Call Back Number: [phone]  Additional Information: patient is battling her second round of Covid. Patient was given a rx and patient has questions if she should take it, as it may raise her BP. Please call patient back.

## 2023-08-21 ENCOUNTER — TELEPHONE (OUTPATIENT)
Dept: INTERNAL MEDICINE | Facility: CLINIC | Age: 78
End: 2023-08-21
Payer: MEDICARE

## 2023-08-21 RX ORDER — PROMETHAZINE HYDROCHLORIDE AND DEXTROMETHORPHAN HYDROBROMIDE 6.25; 15 MG/5ML; MG/5ML
5 SYRUP ORAL EVERY 4 HOURS PRN
Qty: 118 ML | Refills: 0 | Status: SHIPPED | OUTPATIENT
Start: 2023-08-21 | End: 2023-08-31

## 2023-08-21 NOTE — TELEPHONE ENCOUNTER
At last visit we discussed restarting montelukast to see if this helped with the cough. Is patient taking this?

## 2023-08-21 NOTE — TELEPHONE ENCOUNTER
----- Message from Jordana Sood sent at 8/21/2023  9:48 AM CDT -----  .Type:  Needs Medical Advice    Who Called: pt   Would the patient rather a call back or a response via [facility]? Call back   Best Call Back Number: [phone]  Additional Information: Pt needs a cough syrup Pronethaziae. She had it years ago and states that it is really good. She states that her blood pressure is good

## 2023-09-13 ENCOUNTER — TELEPHONE (OUTPATIENT)
Dept: INTERNAL MEDICINE | Facility: CLINIC | Age: 78
End: 2023-09-13
Payer: MEDICARE

## 2023-09-14 ENCOUNTER — LAB VISIT (OUTPATIENT)
Dept: LAB | Facility: HOSPITAL | Age: 78
End: 2023-09-14
Attending: STUDENT IN AN ORGANIZED HEALTH CARE EDUCATION/TRAINING PROGRAM
Payer: MEDICARE

## 2023-09-14 DIAGNOSIS — D64.9 ANEMIA, UNSPECIFIED TYPE: ICD-10-CM

## 2023-09-14 LAB
BASOPHILS # BLD AUTO: 0.04 X10(3)/MCL
BASOPHILS NFR BLD AUTO: 0.8 %
EOSINOPHIL # BLD AUTO: 0.08 X10(3)/MCL (ref 0–0.9)
EOSINOPHIL NFR BLD AUTO: 1.5 %
ERYTHROCYTE [DISTWIDTH] IN BLOOD BY AUTOMATED COUNT: 13.2 % (ref 11.5–17)
FERRITIN SERPL-MCNC: 351.65 NG/ML (ref 4.63–204)
FOLATE SERPL-MCNC: 14.7 NG/ML (ref 7–31.4)
HCT VFR BLD AUTO: 32.7 % (ref 37–47)
HGB BLD-MCNC: 10.5 G/DL (ref 12–16)
IMM GRANULOCYTES # BLD AUTO: 0.03 X10(3)/MCL (ref 0–0.04)
IMM GRANULOCYTES NFR BLD AUTO: 0.6 %
IRON SATN MFR SERPL: 39 % (ref 20–50)
IRON SERPL-MCNC: 125 UG/DL (ref 50–170)
LYMPHOCYTES # BLD AUTO: 1.84 X10(3)/MCL (ref 0.6–4.6)
LYMPHOCYTES NFR BLD AUTO: 35.6 %
MCH RBC QN AUTO: 30.8 PG (ref 27–31)
MCHC RBC AUTO-ENTMCNC: 32.1 G/DL (ref 33–36)
MCV RBC AUTO: 95.9 FL (ref 80–94)
MONOCYTES # BLD AUTO: 0.4 X10(3)/MCL (ref 0.1–1.3)
MONOCYTES NFR BLD AUTO: 7.7 %
NEUTROPHILS # BLD AUTO: 2.78 X10(3)/MCL (ref 2.1–9.2)
NEUTROPHILS NFR BLD AUTO: 53.8 %
NRBC BLD AUTO-RTO: 0 %
PLATELET # BLD AUTO: 228 X10(3)/MCL (ref 130–400)
PMV BLD AUTO: 9.5 FL (ref 7.4–10.4)
RBC # BLD AUTO: 3.41 X10(6)/MCL (ref 4.2–5.4)
TIBC SERPL-MCNC: 193 UG/DL (ref 70–310)
TIBC SERPL-MCNC: 318 UG/DL (ref 250–450)
TRANSFERRIN SERPL-MCNC: 268 MG/DL (ref 173–360)
VIT B12 SERPL-MCNC: 376 PG/ML (ref 213–816)
WBC # SPEC AUTO: 5.17 X10(3)/MCL (ref 4.5–11.5)

## 2023-09-14 PROCEDURE — 36415 COLL VENOUS BLD VENIPUNCTURE: CPT

## 2023-09-14 PROCEDURE — 83540 ASSAY OF IRON: CPT

## 2023-09-14 PROCEDURE — 82746 ASSAY OF FOLIC ACID SERUM: CPT

## 2023-09-14 PROCEDURE — 82728 ASSAY OF FERRITIN: CPT

## 2023-09-14 PROCEDURE — 85025 COMPLETE CBC W/AUTO DIFF WBC: CPT

## 2023-09-14 PROCEDURE — 82607 VITAMIN B-12: CPT

## 2023-09-19 ENCOUNTER — OFFICE VISIT (OUTPATIENT)
Dept: INTERNAL MEDICINE | Facility: CLINIC | Age: 78
End: 2023-09-19
Payer: MEDICARE

## 2023-09-19 VITALS
BODY MASS INDEX: 21.9 KG/M2 | HEART RATE: 81 BPM | HEIGHT: 61 IN | SYSTOLIC BLOOD PRESSURE: 120 MMHG | OXYGEN SATURATION: 99 % | DIASTOLIC BLOOD PRESSURE: 60 MMHG | WEIGHT: 116 LBS

## 2023-09-19 DIAGNOSIS — D64.9 ANEMIA, UNSPECIFIED TYPE: ICD-10-CM

## 2023-09-19 DIAGNOSIS — Z00.00 MEDICARE ANNUAL WELLNESS VISIT, SUBSEQUENT: Primary | ICD-10-CM

## 2023-09-19 DIAGNOSIS — F41.9 ANXIETY: ICD-10-CM

## 2023-09-19 DIAGNOSIS — I10 HYPERTENSION, UNSPECIFIED TYPE: ICD-10-CM

## 2023-09-19 PROCEDURE — G0439 PPPS, SUBSEQ VISIT: HCPCS | Mod: ,,, | Performed by: STUDENT IN AN ORGANIZED HEALTH CARE EDUCATION/TRAINING PROGRAM

## 2023-09-19 PROCEDURE — G0439 PR MEDICARE ANNUAL WELLNESS SUBSEQUENT VISIT: ICD-10-PCS | Mod: ,,, | Performed by: STUDENT IN AN ORGANIZED HEALTH CARE EDUCATION/TRAINING PROGRAM

## 2023-09-19 RX ORDER — RAMIPRIL 5 MG/1
5 CAPSULE ORAL DAILY
Qty: 60 CAPSULE | Refills: 6
Start: 2023-09-19 | End: 2024-03-15 | Stop reason: SDUPTHER

## 2023-09-22 PROBLEM — Z00.00 MEDICARE ANNUAL WELLNESS VISIT, SUBSEQUENT: Status: ACTIVE | Noted: 2023-09-22

## 2023-09-22 NOTE — PROGRESS NOTES
Subjective:      Denise Reece  09/22/2023  [id number]    Zhane Alvarez MD  Patient Care Team:  Zhane Alvarez MD as PCP - General (Internal Medicine)  NOHEMI Sheikh MD as Consulting Physician (Gastroenterology)          Visit Type:a scheduled routine follow-up visit    Chief Complaint: Medicare AWV    HPI  Ms Walker presents for Medicare wellness visit. Patient has no complaints today.     Past Medical History:   Diagnosis Date    Allergy     Anemia     Anxiety     GERD (gastroesophageal reflux disease)     Hyperlipidemia     Hypertension     Osteoporosis     Personal history of colonic polyps     Personal history of colonic polyps 04/05/2022     Past Surgical History:   Procedure Laterality Date    BREAST LUMPECTOMY      COLONOSCOPY  04/05/2022    COLONOSCOPY  04/05/2022     History reviewed. No pertinent family history.  Social History     Tobacco Use    Smoking status: Never    Smokeless tobacco: Never   Substance and Sexual Activity    Alcohol use: Never    Drug use: Never    Sexual activity: Not Currently     Active Problem List with Overview Notes    Diagnosis Date Noted    Medicare annual wellness visit, subsequent 09/22/2023    Aortic atherosclerosis 08/09/2023    Bronchospasm 08/09/2023    Left leg pain 04/14/2023    Anxiety 03/13/2023    Anemia 09/15/2022    GERD (gastroesophageal reflux disease) 09/15/2022    Hypertension 09/15/2022    Osteoarthritis 09/15/2022    Osteopetrosis 09/15/2022    Peripheral edema 06/13/2019    Seasonal allergic rhinitis 04/04/2017     Review of patient's allergies indicates:   Allergen Reactions    Benadryl allergy decongestant        The following were reviewed at this visit: active problem list, medication list, allergies, family history, social history, and health maintenance.    Medications:    Current Outpatient Medications:     albuterol (PROVENTIL/VENTOLIN HFA) 90 mcg/actuation inhaler, INHALE 2 PUFFS INTO LUNGS EVERY 6 HOURS AS  NEEDED FOR WHEEZING FOR UP  DAYS, Disp: , Rfl:     ascorbic acid, vitamin C, (VITAMIN C) 500 MG tablet, Take 500 mg by mouth., Disp: , Rfl:     aspirin 81 MG Chew, Take 1 tablet by mouth every morning., Disp: , Rfl:     calcium carbonate (OS-JAE) 600 mg calcium (1,500 mg) Tab, Take 600 mg by mouth., Disp: , Rfl:     carboxymethylcellulose (REFRESH PLUS) 0.5 % Dpet, 1 drop 3 (three) times daily as needed., Disp: , Rfl:     clonazePAM (KLONOPIN) 0.5 MG tablet, Take 1 tablet (0.5 mg total) by mouth every evening., Disp: 30 tablet, Rfl: 0    diclofenac (VOLTAREN) 75 MG EC tablet, Take 75 mg by mouth 2 (two) times daily., Disp: , Rfl:     famotidine (PEPCID) 20 MG tablet, Take 1 tablet (20 mg total) by mouth 2 (two) times daily. for 10 days, Disp: 20 tablet, Rfl: 0    FLOVENT  mcg/actuation inhaler, INHALE 1 PUFF BY MOUTH IN THE MORNING AND 1 AT BEDTIME, Disp: , Rfl:     montelukast (SINGULAIR) 10 mg tablet, Take 1 tablet (10 mg total) by mouth every evening., Disp: 30 tablet, Rfl: 3    rosuvastatin (CRESTOR) 10 MG tablet, Take 1 tablet by mouth once daily, Disp: 90 tablet, Rfl: 2    tiZANidine (ZANAFLEX) 2 MG tablet, Take 2 tablets (4 mg total) by mouth nightly as needed (msucle pain)., Disp: 20 tablet, Rfl: 0    triamcinolone (NASACORT) 55 mcg nasal inhaler, 2 sprays by Nasal route once daily., Disp: , Rfl:     ramipriL (ALTACE) 5 MG capsule, Take 1 capsule (5 mg total) by mouth once daily., Disp: 60 capsule, Rfl: 6      Medications have been reviewed and reconciled with patient at this visit.  Barriers to medications reviewed with patient.    Adverse reactions to current medications reviewed with patient..    Over the counter medications reviewed and reconciled with patient.    Opioid Screening: Patient medication list reviewed, patient is not taking prescription opioids. Patient is not using additional opioids than prescribed. Patient is at low risk of substance abuse based on this opioid use history.      Review of Systems   Constitutional:  Negative for chills, fever, malaise/fatigue and weight loss.   HENT:  Negative for congestion, ear discharge, ear pain, hearing loss, sinus pain and sore throat.    Eyes:  Negative for photophobia, pain, discharge and redness.   Respiratory:  Negative for cough, shortness of breath and wheezing.    Cardiovascular:  Negative for chest pain, palpitations and leg swelling.   Gastrointestinal:  Negative for constipation, diarrhea, heartburn, nausea and vomiting.   Genitourinary:  Negative for dysuria, frequency and urgency.   Musculoskeletal:  Negative for falls, joint pain and myalgias.   Skin:  Negative for itching and rash.   Neurological:  Negative for dizziness, focal weakness, weakness and headaches.   Psychiatric/Behavioral:  Negative for depression and memory loss. The patient is not nervous/anxious and does not have insomnia.        [age]  Are you male or female?: Female  During the past four weeks, how much have you been bothered by emotional problems such as feeling anxious, depressed, irritable, sad, or downhearted and blue?: Not at all  During the past five weeks, has your physical and/or emotional health limited your social activities with family, friends, neighbors, or groups?: Not at all  During the past four weeks, how much bodily pain have you generally had?: Mild pain  During the past four weeks, was someone available to help if you needed and wanted help?: Yes, as much as I wanted  During the past four weeks, what was the hardest physical activity you could do for at least two minutes?: Moderate  Can you get to places out of walking distance without help?  (For example, can you travel alone on buses or taxis, or drive your own car?): Yes  Can you go shopping for groceries or clothes without someone's help?: Yes  Can you prepare your own meals?: Yes  Can you do your own housework without help?: Yes  Because of any health problems, do you need  "the help of another person with your personal care needs such as eating, bathing, dressing, or getting around the house?: No  Can you handle your own money without help?: Yes  During the past four weeks, how would you rate your health in general?: Very good  How have things been going for you during the past four weeks?: Very well  Are you having difficulties driving your car?: No  Do you always fasten your seat belt when you are in a car?: Yes, usually  How often in the past four weeks have you been bothered by falling or dizzy when standing up?: Never  How often in the past four weeks have you been bothered by sexual problems?: Never  How often in the past four weeks have you been bothered by trouble eating well?: Never  How often in the past four weeks have you been bothered by teeth or denture problems?: Never  How often in the past four weeks have you been bothered with problems using the telephone?: Never  How often in the past four weeks have you been bothered by tiredness or fatigue?: Never  Have you fallen two or more times in the past year?: No  Are you afraid of falling?: No  Are you a smoker?: No  During the past four weeks, how many drinks of wine, beer, or other alcoholic beverages did you have?: No alcohol at all  Do you exercise for about 20 minutes three or more days a week?: Yes, most of the time  Have you been given any information to help you with hazards in your house that might hurt you?: No  Have you been given any information to help you with keeping track of your medications?: No  How often do you have trouble taking medicines the way you've been told to take them?: I always take them as prescribed  How confident are you that you can control and manage most of your health problems?: Very confident  What is your race? (Check all that apply.):           Objective:      Vitals:    09/19/23 1307   BP: 120/60   Pulse: 81   SpO2: 99%   Weight: 52.6 kg (116 lb)   Height: 5' 1" " (1.549 m)       Physical Exam  Constitutional:       General: She is not in acute distress.     Appearance: Normal appearance.   HENT:      Head: Normocephalic and atraumatic.   Eyes:      Extraocular Movements: Extraocular movements intact.      Pupils: Pupils are equal, round, and reactive to light.   Cardiovascular:      Rate and Rhythm: Normal rate and regular rhythm.      Pulses: Normal pulses.      Heart sounds: Normal heart sounds. No murmur heard.     No friction rub. No gallop.   Pulmonary:      Effort: Pulmonary effort is normal.      Breath sounds: Normal breath sounds. No wheezing, rhonchi or rales.   Abdominal:      General: Abdomen is flat. Bowel sounds are normal. There is no distension.      Palpations: Abdomen is soft.      Tenderness: There is no abdominal tenderness.   Musculoskeletal:         General: No swelling or tenderness. Normal range of motion.      Cervical back: Normal range of motion and neck supple. No tenderness.      Right lower leg: No edema.      Left lower leg: No edema.   Lymphadenopathy:      Cervical: No cervical adenopathy.   Skin:     Findings: No lesion or rash.   Neurological:      General: No focal deficit present.      Mental Status: She is alert and oriented to person, place, and time.      Cranial Nerves: No cranial nerve deficit.      Sensory: No sensory deficit.      Motor: No weakness.   Psychiatric:         Mood and Affect: Mood normal.         Behavior: Behavior normal.         Thought Content: Thought content normal.              No data to display                  9/19/2023     1:00 PM 8/7/2023     1:00 PM 4/14/2023     9:20 AM 3/13/2023     1:40 PM 9/13/2022     2:20 PM   Fall Risk Assessment - Outpatient   Mobility Status Ambulatory Ambulatory Ambulatory Ambulatory Ambulatory   Number of falls 0 0 0 0 0   Identified as fall risk False False False False False                 Depression Screening  Over the past two weeks, has the patient felt down, depressed, or  hopeless?: No  Over the past two weeks, has the patient felt little interest or pleasure in doing things?: No  Functional Ability/Safety Screening  Was the patient's timed Up & Go test unsteady or longer than 30 seconds?: No  Does the patient need help with phone, transportation, shopping, preparing meals, housework, laundry, meds, or managing money?: No  Does the patient's home have rugs in the hallway, lack grab bars in the bathroom, lack handrails on the stairs or have poor lighting?: No  Have you noticed any hearing difficulties?: No  Cognitive Function (Assessed through direct observation with due consideration of information obtained by way of patient reports and/or concerns raised by family, friends, caretakers, or others)    Does the patient repeat questions/statements in the same day?: No  Does the patient have trouble remembering the date, year, and time?: No  Does the patient have difficulty managing finances?: No  Does the patient have a decreased sense of direction?: No        Laboratory Reviewed ({Yes)  Lab Results   Component Value Date    WBC 5.17 09/14/2023    HGB 10.5 (L) 09/14/2023    HCT 32.7 (L) 09/14/2023     09/14/2023    CHOL 182 03/16/2023    TRIG 43 03/16/2023    HDL 87 (H) 03/16/2023    ALT 9 03/08/2023    AST 21 03/08/2023     03/08/2023    K 4.3 03/08/2023    CREATININE 0.81 03/08/2023    BUN 18.8 03/08/2023    CO2 27 03/08/2023    TSH 2.315 03/08/2023         Assessment and Plan:       Problem List Items Addressed This Visit          Psychiatric    Anxiety     On clonazepam as needed   Continue             Cardiac/Vascular    Hypertension     Controlled  Continue current medications         Relevant Medications    ramipriL (ALTACE) 5 MG capsule       Oncology    Anemia     Iron studies are normal  Advised patient to discontinue iron tablets              Other    Medicare annual wellness visit, subsequent - Primary     DXA scan: done 10/2022  Labs: reviewed with patient  during visit              Care Plan/Goals: Reviewed    Goals    None         Follow up: Follow up in about 6 months (around 3/19/2024) for Bp follow up.    No orders of the defined types were placed in this encounter.      Medicare Annual Wellness and Personalized Prevention Plan:   Fall Risk + Home Safety + Hearing Impairment + Depression Screen + Cognitive Impairment Screen + Health Risk Assessment all reviewed.     Health Maintenance Topics with due status: Not Due       Topic Last Completion Date    DEXA Scan 10/04/2022    Lipid Panel 04/12/2023      The patient's Health Maintenance was reviewed and the following appears to be due at this time:   Health Maintenance Due   Topic Date Due    Hepatitis C Screening  Never done    Shingles Vaccine (1 of 2) Never done    TETANUS VACCINE  11/14/2021    COVID-19 Vaccine (6 - Pfizer series) 09/01/2022       Advance Care Planning   I attest to discussing Advance Care Planning with patient and/or family member.  Education was provided including the importance of the Health Care Power of , Advance Directives, and/or [place] documentation.  The patient expressed understanding to the importance of this information and discussion.

## 2023-10-10 ENCOUNTER — TELEPHONE (OUTPATIENT)
Dept: INTERNAL MEDICINE | Facility: CLINIC | Age: 78
End: 2023-10-10
Payer: MEDICARE

## 2023-10-10 DIAGNOSIS — I10 PRIMARY HYPERTENSION: ICD-10-CM

## 2023-10-10 RX ORDER — CLONAZEPAM 0.5 MG/1
0.5 TABLET ORAL NIGHTLY
Qty: 30 TABLET | Refills: 0 | Status: SHIPPED | OUTPATIENT
Start: 2023-10-10 | End: 2023-11-10 | Stop reason: SDUPTHER

## 2023-10-10 NOTE — TELEPHONE ENCOUNTER
----- Message from Yakelin Senegal sent at 10/10/2023  9:40 AM CDT -----  Regarding: refill  .Type:  RX Refill Request    Who Called:  pt    Refill or New Rx: refill    RX Name and Strength: clonazePAM (KLONOPIN) 0.5 MG tablet    How is the patient currently taking it? (ex. 1XDay): one day    Is this a 30 day or 90 day RX: 30    Preferred Pharmacy with phone number: [facility] on [address]    Local or Mail Order: local    Ordering Provider: Jann    Would the patient rather a call back? Yes    Best Call Back Number: [phone]    Additional Information:  refill

## 2023-11-10 DIAGNOSIS — I10 PRIMARY HYPERTENSION: ICD-10-CM

## 2023-11-10 RX ORDER — CLONAZEPAM 0.5 MG/1
0.5 TABLET ORAL NIGHTLY
Qty: 30 TABLET | Refills: 0 | Status: SHIPPED | OUTPATIENT
Start: 2023-11-10 | End: 2023-12-11

## 2023-11-10 NOTE — TELEPHONE ENCOUNTER
----- Message from Marie Strauss sent at 11/10/2023  9:34 AM CST -----  Regarding: refill  Type:  RX Refill Request    Who Called: pt  Refill or New Rx:refill  RX Name and Strength:clonazePAM (KLONOPIN) 0.5 MG tablet  How is the patient currently taking it? (ex. 1XDay):  Is this a 30 day or 90 day RX:  [facility]  Local or Mail Order:  Ordering Provider:  Would the patient rather a call back or a response via [facility]?   Best Call Back Number:[phone]  Additional Information: pt called about needing a refill

## 2023-12-11 DIAGNOSIS — I10 PRIMARY HYPERTENSION: ICD-10-CM

## 2023-12-11 RX ORDER — CLONAZEPAM 0.5 MG/1
0.5 TABLET ORAL NIGHTLY
Qty: 30 TABLET | Refills: 0 | Status: SHIPPED | OUTPATIENT
Start: 2023-12-11 | End: 2024-01-11

## 2023-12-14 ENCOUNTER — TELEPHONE (OUTPATIENT)
Dept: INTERNAL MEDICINE | Facility: CLINIC | Age: 78
End: 2023-12-14
Payer: MEDICARE

## 2023-12-14 DIAGNOSIS — E78.5 HYPERLIPIDEMIA, UNSPECIFIED HYPERLIPIDEMIA TYPE: ICD-10-CM

## 2023-12-14 RX ORDER — ROSUVASTATIN CALCIUM 10 MG/1
10 TABLET, COATED ORAL DAILY
Qty: 90 TABLET | Refills: 2 | Status: SHIPPED | OUTPATIENT
Start: 2023-12-14

## 2023-12-14 NOTE — TELEPHONE ENCOUNTER
----- Message from Ximena Gordillo sent at 12/14/2023  1:11 PM CST -----  Type:  RX Refill Request    Who Called: pt  Refill or New Rx:refill  RX Name and Strength:rosuvastatin (CRESTOR) 10 MG tablet  How is the patient currently taking it? (ex. 1XDay): 1x  Is this a 30 day or 90 day RX:90  Preferred Pharmacy with phone number:[address]      Local or Mail Order:local  Ordering Provider:Estevan Forte Jr., MD  Would the patient rather a call back or a response via [facility]? Call back  Best Call Back Number:[phone]  Additional Information: Pt is currently at the pharmacy

## 2023-12-25 PROBLEM — Z00.00 MEDICARE ANNUAL WELLNESS VISIT, SUBSEQUENT: Status: RESOLVED | Noted: 2023-09-22 | Resolved: 2023-12-25

## 2024-01-11 DIAGNOSIS — I10 PRIMARY HYPERTENSION: ICD-10-CM

## 2024-01-11 RX ORDER — CLONAZEPAM 0.5 MG/1
0.5 TABLET ORAL NIGHTLY
Qty: 30 TABLET | Refills: 0 | Status: SHIPPED | OUTPATIENT
Start: 2024-01-11 | End: 2024-02-12

## 2024-01-29 NOTE — TELEPHONE ENCOUNTER
Spoke to Pt, went to Er yesterday, Covid Positive, was prescribed anti-viral, wasn't sure/ afriad to take it, Pt has cough,fatigue, and elevated BP, Advised to monitor BP, and take OTC medication for symptoms, if worsens call office for on-call or call Monday for MD, stated understanding

## 2024-02-12 DIAGNOSIS — I10 PRIMARY HYPERTENSION: ICD-10-CM

## 2024-02-12 RX ORDER — CLONAZEPAM 0.5 MG/1
0.5 TABLET ORAL NIGHTLY
Qty: 30 TABLET | Refills: 0 | Status: SHIPPED | OUTPATIENT
Start: 2024-02-12 | End: 2024-03-14

## 2024-03-11 ENCOUNTER — TELEPHONE (OUTPATIENT)
Dept: INTERNAL MEDICINE | Facility: CLINIC | Age: 79
End: 2024-03-11
Payer: MEDICARE

## 2024-03-14 DIAGNOSIS — I10 PRIMARY HYPERTENSION: ICD-10-CM

## 2024-03-14 RX ORDER — CLONAZEPAM 0.5 MG/1
0.5 TABLET ORAL NIGHTLY
Qty: 30 TABLET | Refills: 0 | Status: SHIPPED | OUTPATIENT
Start: 2024-03-14 | End: 2024-04-15

## 2024-03-15 DIAGNOSIS — I10 HYPERTENSION, UNSPECIFIED TYPE: ICD-10-CM

## 2024-03-15 RX ORDER — RAMIPRIL 5 MG/1
5 CAPSULE ORAL DAILY
Qty: 60 CAPSULE | Refills: 3
Start: 2024-03-15 | End: 2024-03-22 | Stop reason: SDUPTHER

## 2024-03-20 ENCOUNTER — OFFICE VISIT (OUTPATIENT)
Dept: INTERNAL MEDICINE | Facility: CLINIC | Age: 79
End: 2024-03-20
Payer: MEDICARE

## 2024-03-20 VITALS
DIASTOLIC BLOOD PRESSURE: 80 MMHG | BODY MASS INDEX: 21.9 KG/M2 | HEIGHT: 61 IN | HEART RATE: 78 BPM | OXYGEN SATURATION: 99 % | SYSTOLIC BLOOD PRESSURE: 130 MMHG | WEIGHT: 116 LBS

## 2024-03-20 DIAGNOSIS — D64.9 ANEMIA, UNSPECIFIED TYPE: ICD-10-CM

## 2024-03-20 DIAGNOSIS — Z00.00 WELLNESS EXAMINATION: ICD-10-CM

## 2024-03-20 DIAGNOSIS — I10 PRIMARY HYPERTENSION: Primary | ICD-10-CM

## 2024-03-20 PROCEDURE — 99214 OFFICE O/P EST MOD 30 MIN: CPT | Mod: ,,, | Performed by: STUDENT IN AN ORGANIZED HEALTH CARE EDUCATION/TRAINING PROGRAM

## 2024-03-21 ENCOUNTER — LAB VISIT (OUTPATIENT)
Dept: LAB | Facility: HOSPITAL | Age: 79
End: 2024-03-21
Attending: STUDENT IN AN ORGANIZED HEALTH CARE EDUCATION/TRAINING PROGRAM
Payer: MEDICARE

## 2024-03-21 DIAGNOSIS — Z00.00 WELLNESS EXAMINATION: ICD-10-CM

## 2024-03-21 DIAGNOSIS — D64.9 ANEMIA, UNSPECIFIED TYPE: ICD-10-CM

## 2024-03-21 LAB
ALBUMIN SERPL-MCNC: 3.8 G/DL (ref 3.4–4.8)
ALBUMIN/GLOB SERPL: 1.3 RATIO (ref 1.1–2)
ALP SERPL-CCNC: 48 UNIT/L (ref 40–150)
ALT SERPL-CCNC: 8 UNIT/L (ref 0–55)
AST SERPL-CCNC: 22 UNIT/L (ref 5–34)
BASOPHILS # BLD AUTO: 0.03 X10(3)/MCL
BASOPHILS NFR BLD AUTO: 0.6 %
BILIRUB SERPL-MCNC: 0.4 MG/DL
BUN SERPL-MCNC: 25.9 MG/DL (ref 9.8–20.1)
CALCIUM SERPL-MCNC: 9 MG/DL (ref 8.4–10.2)
CHLORIDE SERPL-SCNC: 107 MMOL/L (ref 98–107)
CHOLEST SERPL-MCNC: 162 MG/DL
CHOLEST/HDLC SERPL: 2 {RATIO} (ref 0–5)
CO2 SERPL-SCNC: 27 MMOL/L (ref 23–31)
CREAT SERPL-MCNC: 0.92 MG/DL (ref 0.55–1.02)
EOSINOPHIL # BLD AUTO: 0.1 X10(3)/MCL (ref 0–0.9)
EOSINOPHIL NFR BLD AUTO: 1.9 %
ERYTHROCYTE [DISTWIDTH] IN BLOOD BY AUTOMATED COUNT: 13.4 % (ref 11.5–17)
GFR SERPLBLD CREATININE-BSD FMLA CKD-EPI: >60 MLS/MIN/1.73/M2
GLOBULIN SER-MCNC: 2.9 GM/DL (ref 2.4–3.5)
GLUCOSE SERPL-MCNC: 88 MG/DL (ref 82–115)
HCT VFR BLD AUTO: 32.3 % (ref 37–47)
HDLC SERPL-MCNC: 72 MG/DL (ref 35–60)
HGB BLD-MCNC: 10.3 G/DL (ref 12–16)
IMM GRANULOCYTES # BLD AUTO: 0.02 X10(3)/MCL (ref 0–0.04)
IMM GRANULOCYTES NFR BLD AUTO: 0.4 %
LDLC SERPL CALC-MCNC: 83 MG/DL (ref 50–140)
LYMPHOCYTES # BLD AUTO: 1.92 X10(3)/MCL (ref 0.6–4.6)
LYMPHOCYTES NFR BLD AUTO: 36.4 %
MCH RBC QN AUTO: 30.2 PG (ref 27–31)
MCHC RBC AUTO-ENTMCNC: 31.9 G/DL (ref 33–36)
MCV RBC AUTO: 94.7 FL (ref 80–94)
MONOCYTES # BLD AUTO: 0.4 X10(3)/MCL (ref 0.1–1.3)
MONOCYTES NFR BLD AUTO: 7.6 %
NEUTROPHILS # BLD AUTO: 2.8 X10(3)/MCL (ref 2.1–9.2)
NEUTROPHILS NFR BLD AUTO: 53.1 %
NRBC BLD AUTO-RTO: 0 %
PLATELET # BLD AUTO: 199 X10(3)/MCL (ref 130–400)
PMV BLD AUTO: 9.7 FL (ref 7.4–10.4)
POTASSIUM SERPL-SCNC: 3.9 MMOL/L (ref 3.5–5.1)
PROT SERPL-MCNC: 6.7 GM/DL (ref 5.8–7.6)
RBC # BLD AUTO: 3.41 X10(6)/MCL (ref 4.2–5.4)
SODIUM SERPL-SCNC: 141 MMOL/L (ref 136–145)
TRIGL SERPL-MCNC: 33 MG/DL (ref 37–140)
TSH SERPL-ACNC: 1.45 UIU/ML (ref 0.35–4.94)
VLDLC SERPL CALC-MCNC: 7 MG/DL
WBC # SPEC AUTO: 5.27 X10(3)/MCL (ref 4.5–11.5)

## 2024-03-21 PROCEDURE — 84443 ASSAY THYROID STIM HORMONE: CPT

## 2024-03-21 PROCEDURE — 80061 LIPID PANEL: CPT

## 2024-03-21 PROCEDURE — 85025 COMPLETE CBC W/AUTO DIFF WBC: CPT

## 2024-03-21 PROCEDURE — 80053 COMPREHEN METABOLIC PANEL: CPT

## 2024-03-21 PROCEDURE — 36415 COLL VENOUS BLD VENIPUNCTURE: CPT

## 2024-03-22 DIAGNOSIS — I10 HYPERTENSION, UNSPECIFIED TYPE: ICD-10-CM

## 2024-03-22 RX ORDER — RAMIPRIL 5 MG/1
5 CAPSULE ORAL DAILY
Qty: 60 CAPSULE | Refills: 3 | Status: SHIPPED | OUTPATIENT
Start: 2024-03-22 | End: 2024-03-25

## 2024-03-25 DIAGNOSIS — I10 HYPERTENSION, UNSPECIFIED TYPE: ICD-10-CM

## 2024-03-25 RX ORDER — RAMIPRIL 5 MG/1
5 CAPSULE ORAL
Qty: 30 CAPSULE | Refills: 6 | Status: SHIPPED | OUTPATIENT
Start: 2024-03-25

## 2024-03-27 ENCOUNTER — TELEPHONE (OUTPATIENT)
Dept: INTERNAL MEDICINE | Facility: CLINIC | Age: 79
End: 2024-03-27
Payer: MEDICARE

## 2024-03-27 NOTE — TELEPHONE ENCOUNTER
----- Message from Brittaney Stephens sent at 3/27/2024 12:29 PM CDT -----  Regarding: Labs  .Type:  Needs Medical Advice    Who Called: Pt  Symptoms (please be specific):    How long has patient had these symptoms:    Pharmacy name and phone #:    Would the patient rather a call back or a response via [facility]? Call back  Best Call Back Number: [phone]  Additional Information: Pt requesting a call back in regards to her labs.

## 2024-03-27 NOTE — TELEPHONE ENCOUNTER
Patient wants to  a copy today to take to another office visit and she would like a call back with the results

## 2024-03-27 NOTE — PROGRESS NOTES
Please inform patient of results.    Labs show anemia, this is stable as compared to labs done 1 year ago, rest of labs are within normal limits

## 2024-03-27 NOTE — TELEPHONE ENCOUNTER
----- Message from Brittaney Stephens sent at 3/27/2024 12:29 PM CDT -----  Regarding: Labs  .Type:  Needs Medical Advice    Who Called: Pt  Symptoms (please be specific):    How long has patient had these symptoms:    Pharmacy name and phone #:    Would the patient rather a call back or a response via [facility]? Call back  Best Call Back Number: [phone]  Additional Information: Pt requesting a call back in regards to her labs.

## 2024-03-28 NOTE — PROGRESS NOTES
Subjective:      Denise Reece  04/01/2024  [id number]      Chief Complaint: Follow-up       HPI:  Ms Walker presents for follow up. Blood pressure is well controlled. No complaints today.     Past Medical History:   Diagnosis Date    Allergy     Anemia     Anxiety     GERD (gastroesophageal reflux disease)     Hyperlipidemia     Hypertension     Osteoporosis     Personal history of colonic polyps     Personal history of colonic polyps 04/05/2022     Past Surgical History:   Procedure Laterality Date    BREAST LUMPECTOMY      COLONOSCOPY  04/05/2022    COLONOSCOPY  04/05/2022     History reviewed. No pertinent family history.  Social History     Tobacco Use    Smoking status: Never    Smokeless tobacco: Never   Substance and Sexual Activity    Alcohol use: Never    Drug use: Never    Sexual activity: Not Currently     Review of patient's allergies indicates:   Allergen Reactions    Benadryl allergy decongestant     Diphenhydramine hcl        The following were reviewed at this visit: active problem list, medication list, allergies, family history, social history, and health maintenance.    Medications:    Current Outpatient Medications:     ascorbic acid, vitamin C, (VITAMIN C) 500 MG tablet, Take 500 mg by mouth., Disp: , Rfl:     calcium carbonate (OS-JAE) 600 mg calcium (1,500 mg) Tab, Take 600 mg by mouth., Disp: , Rfl:     carboxymethylcellulose (REFRESH PLUS) 0.5 % Dpet, 1 drop 3 (three) times daily as needed., Disp: , Rfl:     clonazePAM (KLONOPIN) 0.5 MG tablet, Take 1 tablet by mouth in the evening, Disp: 30 tablet, Rfl: 0    famotidine (PEPCID) 20 MG tablet, Take 1 tablet (20 mg total) by mouth 2 (two) times daily. for 10 days (Patient taking differently: Take 20 mg by mouth 2 (two) times daily. PRN 1-2 days out of a week), Disp: 20 tablet, Rfl: 0    FLOVENT  mcg/actuation inhaler, INHALE 1 PUFF BY MOUTH IN THE MORNING AND 1 AT BEDTIME, Disp: , Rfl:     rosuvastatin (CRESTOR) 10 MG tablet,  Take 1 tablet (10 mg total) by mouth once daily., Disp: 90 tablet, Rfl: 2    albuterol (PROVENTIL/VENTOLIN HFA) 90 mcg/actuation inhaler, INHALE 2 PUFFS INTO LUNGS EVERY 6 HOURS AS NEEDED FOR WHEEZING FOR UP  DAYS, Disp: , Rfl:     diclofenac (VOLTAREN) 75 MG EC tablet, Take 75 mg by mouth 2 (two) times daily., Disp: , Rfl:     montelukast (SINGULAIR) 10 mg tablet, Take 1 tablet (10 mg total) by mouth every evening. (Patient not taking: Reported on 3/20/2024), Disp: 30 tablet, Rfl: 3    ramipriL (ALTACE) 5 MG capsule, TAKE 1 CAPSULE BY MOUTH ONCE DAILY, Disp: 30 capsule, Rfl: 6    tiZANidine (ZANAFLEX) 2 MG tablet, Take 2 tablets (4 mg total) by mouth nightly as needed (msucle pain). (Patient not taking: Reported on 3/20/2024), Disp: 20 tablet, Rfl: 0      Medications have been reviewed and reconciled with patient at this visit.  Barriers to medications reviewed with patient.    Adverse reactions to current medications reviewed with patient..    Over the counter medications reviewed and reconciled with patient.  Review of Systems   Constitutional:  Negative for chills, fever, malaise/fatigue and weight loss.   HENT:  Negative for congestion, ear discharge, ear pain, hearing loss, sinus pain and sore throat.    Eyes:  Negative for photophobia, pain, discharge and redness.   Respiratory:  Negative for cough, shortness of breath and wheezing.    Cardiovascular:  Negative for chest pain, palpitations and leg swelling.   Gastrointestinal:  Negative for constipation, diarrhea, heartburn, nausea and vomiting.   Genitourinary:  Negative for dysuria, frequency and urgency.   Musculoskeletal:  Negative for falls, joint pain and myalgias.   Skin:  Negative for itching and rash.   Neurological:  Negative for dizziness, focal weakness, weakness and headaches.   Psychiatric/Behavioral:  Negative for depression and memory loss. The patient is not nervous/anxious and does not have insomnia.            Objective:      Vitals:  "   03/20/24 1511   BP: 130/80   Pulse: 78   SpO2: 99%   Weight: 52.6 kg (116 lb)   Height: 5' 1" (1.549 m)       Physical Exam  Constitutional:       General: She is not in acute distress.     Appearance: Normal appearance.   HENT:      Head: Normocephalic and atraumatic.   Eyes:      Extraocular Movements: Extraocular movements intact.      Pupils: Pupils are equal, round, and reactive to light.   Cardiovascular:      Rate and Rhythm: Normal rate and regular rhythm.      Pulses: Normal pulses.      Heart sounds: Normal heart sounds. No murmur heard.     No friction rub. No gallop.   Pulmonary:      Effort: Pulmonary effort is normal.      Breath sounds: Normal breath sounds. No wheezing, rhonchi or rales.   Abdominal:      General: Abdomen is flat. Bowel sounds are normal. There is no distension.      Palpations: Abdomen is soft.      Tenderness: There is no abdominal tenderness.   Musculoskeletal:         General: No swelling or tenderness. Normal range of motion.      Cervical back: Normal range of motion and neck supple. No tenderness.      Right lower leg: No edema.      Left lower leg: No edema.   Lymphadenopathy:      Cervical: No cervical adenopathy.   Skin:     Findings: No lesion or rash.   Neurological:      General: No focal deficit present.      Mental Status: She is alert and oriented to person, place, and time.      Cranial Nerves: No cranial nerve deficit.      Sensory: No sensory deficit.      Motor: No weakness.   Psychiatric:         Mood and Affect: Mood normal.         Behavior: Behavior normal.         Thought Content: Thought content normal.               Assessment and Plan:       1. Primary hypertension  Assessment & Plan:  Controlled  Continue ramipril       2. Anemia, unspecified type  Assessment & Plan:  Will check CBC  Stable     Orders:  -     CBC Auto Differential; Future; Expected date: 03/20/2024    3. Wellness examination  -     CBC Auto Differential; Future; Expected date: " 03/20/2024  -     Comprehensive Metabolic Panel; Future; Expected date: 03/20/2024  -     Lipid Panel; Future; Expected date: 03/20/2024  -     TSH; Future; Expected date: 03/20/2024            Follow up: Follow up in about 6 months (around 9/20/2024) for wellness.

## 2024-04-15 DIAGNOSIS — I10 PRIMARY HYPERTENSION: ICD-10-CM

## 2024-04-15 RX ORDER — CLONAZEPAM 0.5 MG/1
0.5 TABLET ORAL NIGHTLY
Qty: 30 TABLET | Refills: 0 | Status: SHIPPED | OUTPATIENT
Start: 2024-04-15 | End: 2024-05-16

## 2024-05-15 DIAGNOSIS — I10 PRIMARY HYPERTENSION: ICD-10-CM

## 2024-05-16 RX ORDER — CLONAZEPAM 0.5 MG/1
0.5 TABLET ORAL NIGHTLY
Qty: 30 TABLET | Refills: 0 | Status: SHIPPED | OUTPATIENT
Start: 2024-05-16 | End: 2024-06-13 | Stop reason: SDUPTHER

## 2024-06-13 DIAGNOSIS — I10 PRIMARY HYPERTENSION: ICD-10-CM

## 2024-06-13 RX ORDER — CLONAZEPAM 0.5 MG/1
0.5 TABLET ORAL NIGHTLY
Qty: 30 TABLET | Refills: 0 | Status: SHIPPED | OUTPATIENT
Start: 2024-06-13

## 2024-06-13 NOTE — TELEPHONE ENCOUNTER
----- Message from Leo Peck sent at 6/13/2024  9:22 AM CDT -----  .Type:  Needs Medical Advice    Who Called: Denise   Symptoms (please be specific):    How long has patient had these symptoms:    Pharmacy name and phone #:    Would the patient rather a call back or a response via [facility]?   Best Call Back Number: [phone]  Additional Information: Patient requested to speak with the nurse re:  general concern about her medication.

## 2024-06-13 NOTE — TELEPHONE ENCOUNTER
Patient is needing a refill on the Clonazepam she was only able to  #25 Clonazepam on 5/21/24 and she has #5 left as of today. Some how the rx was messed up back in May and she is trying to get it back on track.    Would she be able to pick it up early? If so we can put a message on rx ok to fill early

## 2024-07-14 DIAGNOSIS — I10 PRIMARY HYPERTENSION: ICD-10-CM

## 2024-07-15 RX ORDER — CLONAZEPAM 0.5 MG/1
0.5 TABLET ORAL NIGHTLY
Qty: 30 TABLET | Refills: 0 | Status: SHIPPED | OUTPATIENT
Start: 2024-07-15

## 2024-08-16 DIAGNOSIS — I10 PRIMARY HYPERTENSION: ICD-10-CM

## 2024-08-16 RX ORDER — CLONAZEPAM 0.5 MG/1
0.5 TABLET ORAL NIGHTLY
Qty: 30 TABLET | Refills: 0 | Status: SHIPPED | OUTPATIENT
Start: 2024-08-16

## 2024-08-23 LAB
ALBUMIN/GLOB SERPL ELPH: 1.7 {RATIO}
ALBUMIN: 4.2
ALP ISOS SERPL LEV INH-CCNC: 43 U/L
ALT: 6
AST: 21
BILIRUBIN TOTAL: 0.5
BUN BLD-MCNC: 25 MG/DL (ref 4–21)
CALCIUM SERPL-MCNC: 8.9 MG/DL
CHLORIDE: 106 MMOL/L
CHOLEST SERPL-MCNC: 161 MG/DL
CO2 SERPL-SCNC: 26 MMOL/L
CREAT SERPL-MCNC: 0.8 MG/DL
EGFR: 84.2
GLOBULIN: 2.5
GLUCOSE: 83
HDLC SERPL-MCNC: 87 MG/DL
LDLC SERPL CALC-MCNC: 65 MG/DL
NON HDL CHOL (CALC): 74
POTASSIUM: 4.5 MMOL/L
PROTEIN: 6.7
SODIUM: 141 MMOL/L
TRIGL SERPL-MCNC: 45 MG/DL
TSH: 2.49
VLDL CHOLESTEROL: 9 MG/DL

## 2024-09-05 ENCOUNTER — TELEPHONE (OUTPATIENT)
Dept: INTERNAL MEDICINE | Facility: CLINIC | Age: 79
End: 2024-09-05
Payer: MEDICARE

## 2024-09-05 NOTE — TELEPHONE ENCOUNTER
----- Message from Jennifer Sarmiento sent at 9/5/2024 11:40 AM CDT -----  .Who Called: Denise Reece    Caller is requesting assistance/information from provider's office.    Symptoms (please be specific): n/a     How long has patient had these symptoms:  n/a    List of preferred pharmacies on file (remove unneeded): [unfilled]  If different, enter pharmacy into here including location and phone number: n/a    Preferred Method of Contact: Phone Call    Patient's Preferred Phone Number on File: [phone]     Best Call Back Number, if different:    Additional Information: Pt states she's needing to speak with someone regarding pulmonologist. Please advise, thank you

## 2024-09-05 NOTE — TELEPHONE ENCOUNTER
Spoke to Ancelmo with pulmonologist and was told she has mild asthma. She was able to set everything straight with her.

## 2024-09-16 DIAGNOSIS — I10 PRIMARY HYPERTENSION: ICD-10-CM

## 2024-09-16 RX ORDER — CLONAZEPAM 0.5 MG/1
0.5 TABLET ORAL NIGHTLY
Qty: 30 TABLET | Refills: 0 | Status: CANCELLED | OUTPATIENT
Start: 2024-09-16

## 2024-09-17 ENCOUNTER — TELEPHONE (OUTPATIENT)
Dept: INTERNAL MEDICINE | Facility: CLINIC | Age: 79
End: 2024-09-17
Payer: MEDICARE

## 2024-09-17 DIAGNOSIS — I10 PRIMARY HYPERTENSION: ICD-10-CM

## 2024-09-17 DIAGNOSIS — Z00.00 WELL ADULT EXAM: Primary | ICD-10-CM

## 2024-09-17 DIAGNOSIS — E78.5 HYPERLIPIDEMIA, UNSPECIFIED HYPERLIPIDEMIA TYPE: ICD-10-CM

## 2024-09-17 NOTE — TELEPHONE ENCOUNTER
----- Message from Kimmy Riley sent at 9/17/2024  3:49 PM CDT -----  .Type:  Patient Returning Call    Who Called:pt  Who Left Message for Patient:pt  Does the patient know what this is regarding?:Labs  Would the patient rather a call back or a response via [facility]?   Best Call Back Number:[phone]  Additional Information: Please call about labs patient stats she just did labs Dr Tran

## 2024-09-17 NOTE — TELEPHONE ENCOUNTER
Advised patient that we requested the results to be faxed and if there is something missing we will call her and advise

## 2024-09-20 DIAGNOSIS — I10 PRIMARY HYPERTENSION: ICD-10-CM

## 2024-09-20 NOTE — TELEPHONE ENCOUNTER
----- Message from Yvonne Marcos sent at 9/20/2024  9:54 AM CDT -----  .Who Called: Denise Reece    Refill or New Rx:Refill  RX Name and Strength:clonazePAM (KLONOPIN) 0.5 MG tablet  How is the patient currently taking it? (ex. 1XDay):1x day  Is this a 30 day or 90 day RX:30  Local or Mail Order:local  List of preferred pharmacies on file (remove unneeded): [unfilled]  If different Pharmacy is requested, enter Pharmacy information here including location and phone number: same   Ordering Provider:Lauraeno      Preferred Method of Contact: Phone Call  Patient's Preferred Phone Number on File: [phone]   Best Call Back Number, if different:  Additional Information: one last pill today

## 2024-09-23 RX ORDER — CLONAZEPAM 0.5 MG/1
0.5 TABLET ORAL NIGHTLY
Qty: 30 TABLET | Refills: 0 | Status: SHIPPED | OUTPATIENT
Start: 2024-09-23

## 2024-09-24 ENCOUNTER — OFFICE VISIT (OUTPATIENT)
Dept: INTERNAL MEDICINE | Facility: CLINIC | Age: 79
End: 2024-09-24
Payer: MEDICARE

## 2024-09-24 VITALS
TEMPERATURE: 98 F | SYSTOLIC BLOOD PRESSURE: 138 MMHG | HEIGHT: 61 IN | BODY MASS INDEX: 21.34 KG/M2 | WEIGHT: 113 LBS | HEART RATE: 67 BPM | DIASTOLIC BLOOD PRESSURE: 78 MMHG | OXYGEN SATURATION: 98 %

## 2024-09-24 DIAGNOSIS — Z76.89 ENCOUNTER FOR NAIL CARE: ICD-10-CM

## 2024-09-24 DIAGNOSIS — F33.1 MAJOR DEPRESSIVE DISORDER, RECURRENT, MODERATE: ICD-10-CM

## 2024-09-24 DIAGNOSIS — I70.0 AORTIC ATHEROSCLEROSIS: ICD-10-CM

## 2024-09-24 DIAGNOSIS — Z00.00 MEDICARE ANNUAL WELLNESS VISIT, SUBSEQUENT: ICD-10-CM

## 2024-09-24 DIAGNOSIS — I10 PRIMARY HYPERTENSION: ICD-10-CM

## 2024-09-24 PROCEDURE — G0439 PPPS, SUBSEQ VISIT: HCPCS | Mod: ,,, | Performed by: STUDENT IN AN ORGANIZED HEALTH CARE EDUCATION/TRAINING PROGRAM

## 2024-09-24 RX ORDER — KETOCONAZOLE 20 MG/ML
SHAMPOO, SUSPENSION TOPICAL WEEKLY
COMMUNITY
Start: 2024-04-11

## 2024-09-24 RX ORDER — METOPROLOL SUCCINATE 50 MG/1
25 TABLET, EXTENDED RELEASE ORAL NIGHTLY
COMMUNITY
Start: 2024-09-09

## 2024-09-24 RX ORDER — AZELASTINE 1 MG/ML
1 SPRAY, METERED NASAL 2 TIMES DAILY
COMMUNITY
Start: 2024-05-25 | End: 2025-05-25

## 2024-09-24 RX ORDER — RAMIPRIL 10 MG/1
10 CAPSULE ORAL EVERY MORNING
COMMUNITY
Start: 2024-06-07

## 2024-09-24 RX ORDER — FLUTICASONE PROPIONATE 110 UG/1
1 AEROSOL, METERED RESPIRATORY (INHALATION) EVERY 12 HOURS
COMMUNITY
Start: 2024-09-10

## 2024-09-24 RX ORDER — FLUTICASONE PROPIONATE 50 MCG
1 SPRAY, SUSPENSION (ML) NASAL DAILY PRN
COMMUNITY
Start: 2024-05-25 | End: 2024-11-21

## 2024-09-24 RX ORDER — AZELAIC ACID 0.15 G/G
GEL TOPICAL EVERY MORNING
COMMUNITY
Start: 2024-04-11 | End: 2024-09-24

## 2024-09-30 NOTE — PROGRESS NOTES
Subjective:      Denise Reece  09/30/2024  [id number]    Zhane Alvarez MD  Patient Care Team:  Zhane Alvarez MD as PCP - General (Internal Medicine)  NOHEMI Sheikh MD as Consulting Physician (Gastroenterology)          Visit Type:a scheduled routine follow-up visit    Chief Complaint: Medicare AWV Follow Up    HPI  Ms Walker presents for Medicare annual wellness visit. Patient would like a referral to Podiatry for nail fungus. No other concerns.     Past Medical History:   Diagnosis Date    Allergy     Anemia     Anxiety     GERD (gastroesophageal reflux disease)     Hyperlipidemia     Hypertension     Osteoporosis     Personal history of colonic polyps     Personal history of colonic polyps 04/05/2022     Past Surgical History:   Procedure Laterality Date    BREAST LUMPECTOMY      COLONOSCOPY  04/05/2022    COLONOSCOPY  04/05/2022     No family history on file.  Social History     Tobacco Use    Smoking status: Never    Smokeless tobacco: Never   Substance and Sexual Activity    Alcohol use: Never    Drug use: Never    Sexual activity: Not Currently     Active Problem List with Overview Notes    Diagnosis Date Noted    Major depressive disorder, recurrent, moderate 09/24/2024    Medicare annual wellness visit, subsequent 09/22/2023    Aortic atherosclerosis 08/09/2023    Bronchospasm 08/09/2023    Left leg pain 04/14/2023    Anxiety 03/13/2023    Anemia 09/15/2022    GERD (gastroesophageal reflux disease) 09/15/2022    Hypertension 09/15/2022    Osteoarthritis 09/15/2022    Osteopetrosis 09/15/2022    Peripheral edema 06/13/2019    Seasonal allergic rhinitis 04/04/2017     Review of patient's allergies indicates:   Allergen Reactions    Benadryl allergy decongestant     Diphenhydramine hcl        The following were reviewed at this visit: active problem list, medication list, allergies, family history, social history, and health maintenance.    Medications:    Current  Outpatient Medications:     albuterol (PROVENTIL/VENTOLIN HFA) 90 mcg/actuation inhaler, INHALE 2 PUFFS INTO LUNGS EVERY 6 HOURS AS NEEDED FOR WHEEZING FOR UP  DAYS, Disp: , Rfl:     ascorbic acid, vitamin C, (VITAMIN C) 500 MG tablet, Take 500 mg by mouth., Disp: , Rfl:     azelastine (ASTELIN) 137 mcg (0.1 %) nasal spray, 1 spray by Nasal route 2 (two) times daily., Disp: , Rfl:     calcium carbonate (OS-JAE) 600 mg calcium (1,500 mg) Tab, Take 600 mg by mouth., Disp: , Rfl:     carboxymethylcellulose (REFRESH PLUS) 0.5 % Dpet, 1 drop 3 (three) times daily as needed., Disp: , Rfl:     clonazePAM (KLONOPIN) 0.5 MG tablet, Take 1 tablet (0.5 mg total) by mouth every evening., Disp: 30 tablet, Rfl: 0    diclofenac (VOLTAREN) 75 MG EC tablet, Take 75 mg by mouth 2 (two) times daily., Disp: , Rfl:     famotidine (PEPCID) 20 MG tablet, Take 1 tablet (20 mg total) by mouth 2 (two) times daily. for 10 days (Patient taking differently: Take 20 mg by mouth 2 (two) times daily. PRN 1-2 days out of a week), Disp: 20 tablet, Rfl: 0    fluticasone propionate (FLONASE) 50 mcg/actuation nasal spray, 1 spray by Each Nostril route daily as needed., Disp: , Rfl:     fluticasone propionate (FLOVENT HFA) 110 mcg/actuation inhaler, Inhale 1 puff into the lungs every 12 (twelve) hours., Disp: , Rfl:     ketoconazole (NIZORAL) 2 % shampoo, Apply topically once a week., Disp: , Rfl:     metoprolol succinate (TOPROL-XL) 50 MG 24 hr tablet, Take 25 mg by mouth every evening., Disp: , Rfl:     ramipriL (ALTACE) 10 MG capsule, Take 10 mg by mouth every morning., Disp: , Rfl:     rosuvastatin (CRESTOR) 10 MG tablet, Take 1 tablet (10 mg total) by mouth once daily., Disp: 90 tablet, Rfl: 2      Medications have been reviewed and reconciled with patient at this visit.  Barriers to medications reviewed with patient.    Adverse reactions to current medications reviewed with patient..    Over the counter medications reviewed and reconciled  "with patient.    Opioid Screening: Patient medication list reviewed, patient is not taking prescription opioids. Patient is not using additional opioids than prescribed. Patient is at low risk of substance abuse based on this opioid use history.     Review of Systems   Constitutional:  Negative for chills, fever, malaise/fatigue and weight loss.   HENT:  Negative for congestion, ear discharge, ear pain, hearing loss, sinus pain and sore throat.    Eyes:  Negative for photophobia, pain, discharge and redness.   Respiratory:  Negative for cough, shortness of breath and wheezing.    Cardiovascular:  Negative for chest pain, palpitations and leg swelling.   Gastrointestinal:  Negative for constipation, diarrhea, heartburn, nausea and vomiting.   Genitourinary:  Negative for dysuria, frequency and urgency.   Musculoskeletal:  Negative for falls, joint pain and myalgias.   Skin:  Negative for itching and rash.   Neurological:  Negative for dizziness, focal weakness, weakness and headaches.   Psychiatric/Behavioral:  Negative for depression and memory loss. The patient is not nervous/anxious and does not have insomnia.                   Objective:      Vitals:    09/24/24 1403   BP: 138/78   BP Location: Left arm   Pulse: 67   Temp: 98.3 °F (36.8 °C)   SpO2: 98%   Weight: 51.3 kg (113 lb)   Height: 5' 1" (1.549 m)       Physical Exam  Constitutional:       General: She is not in acute distress.     Appearance: Normal appearance.   HENT:      Head: Normocephalic and atraumatic.   Eyes:      Extraocular Movements: Extraocular movements intact.      Pupils: Pupils are equal, round, and reactive to light.   Cardiovascular:      Rate and Rhythm: Normal rate and regular rhythm.      Pulses: Normal pulses.      Heart sounds: Normal heart sounds.   Pulmonary:      Effort: Pulmonary effort is normal.      Breath sounds: Normal breath sounds.   Abdominal:      General: Abdomen is flat. Bowel sounds are normal. There is no " distension.      Palpations: Abdomen is soft.      Tenderness: There is no abdominal tenderness.   Musculoskeletal:         General: Normal range of motion.      Cervical back: Normal range of motion and neck supple.      Right lower leg: No edema.      Left lower leg: No edema.   Lymphadenopathy:      Cervical: No cervical adenopathy.   Skin:     Findings: No lesion or rash.   Neurological:      General: No focal deficit present.      Mental Status: She is alert and oriented to person, place, and time.           A comprehensive HEALTH RISK ASSESSMENT was completed today. Results are summarized below:    There are NO EMOTIONAL/SOCIAL CONCERNS identified on today's screening for Social Isolation, Depression and Anxiety.    There are NO COGNITIVE FUNCTION CONCERNS identified on today's screening.  There are NO FUNCTIONAL OR SAFETY CONCERNS were identified on today's screening for Physical Symptoms, Nutritional, Cognitive Function, Home Safety/Living Situation, Fall Risk, Activities of Daily Living, Independent Activities of Daily Living, Physical Activity, Timed Up and Go test and Whisper test.   The patient reports NO OPIOID PRESCRIPTIONS. This was confirmed through medication reconciliation and the [facility] website.    The patient is NOT A TOBACCO USER.  The patient reports NO SIGNIFICANT ALCOHOL USE.     All Questions regarding food, transportation or housing were not answered today.        Laboratory Reviewed ({Yes)  Lab Results   Component Value Date    WBC 5.27 03/21/2024    HGB 10.3 (L) 03/21/2024    HCT 32.3 (L) 03/21/2024     03/21/2024    CHOL 161 08/23/2024    TRIG 45 08/23/2024    HDL 87 08/23/2024    ALT 6 08/23/2024    AST 22 03/21/2024     08/23/2024    K 4.5 08/23/2024     08/23/2024    CREATININE 0.8 08/23/2024    BUN 25 (A) 08/23/2024    CO2 26 08/23/2024    TSH 2.49 08/23/2024         Assessment and Plan:       Problem List Items Addressed This Visit          Psychiatric    Major  depressive disorder, recurrent, moderate     Stable  Not currently on medication             Cardiac/Vascular    Hypertension     Controlled  Continue ramipril          Aortic atherosclerosis     Stable  Continue aspirin and rosuvastatin             Other    Medicare annual wellness visit, subsequent     DXA: done 10/2022  Labs: done and reviewed in office           Other Visit Diagnoses       Encounter for nail care        Relevant Orders    Ambulatory referral/consult to Podiatry              Care Plan/Goals: Reviewed    Goals    None         Follow up: Follow up in about 6 months (around 3/24/2025) for Bp follow up.    Orders Placed This Encounter   Procedures    Comprehensive Metabolic Panel     This order was created through External Result Entry    Lipid Panel     This order was created through External Result Entry    TSH     This order was created through External Result Entry    Ambulatory referral/consult to Podiatry     Standing Status:   Future     Standing Expiration Date:   10/24/2025     Referral Priority:   Routine     Referral Type:   Consultation     Referral Reason:   Specialty Services Required     Referred to Provider:   Wilbert Silverman DPM     Requested Specialty:   Podiatry     Number of Visits Requested:   1       Medicare Annual Wellness and Personalized Prevention Plan:   Fall Risk + Home Safety + Hearing Impairment + Depression Screen + Cognitive Impairment Screen + Health Risk Assessment all reviewed.     Health Maintenance Topics with due status: Not Due       Topic Last Completion Date    DEXA Scan 10/04/2022    Lipid Panel 08/23/2024      The patient's Health Maintenance was reviewed and the following appears to be due at this time:   Health Maintenance Due   Topic Date Due    Hepatitis C Screening  Never done    Shingles Vaccine (1 of 2) Never done    TETANUS VACCINE  11/14/2021    COVID-19 Vaccine (7 - 2024-25 season) 09/01/2024       Advance Care Planning   I attest to discussing  Advance Care Planning with patient and/or family member.  Education was provided including the importance of the Health Care Power of , Advance Directives, and/or [place] documentation.  The patient expressed understanding to the importance of this information and discussion.

## 2024-10-22 ENCOUNTER — TELEPHONE (OUTPATIENT)
Dept: INTERNAL MEDICINE | Facility: CLINIC | Age: 79
End: 2024-10-22

## 2024-10-22 ENCOUNTER — OFFICE VISIT (OUTPATIENT)
Dept: INTERNAL MEDICINE | Facility: CLINIC | Age: 79
End: 2024-10-22
Payer: MEDICARE

## 2024-10-22 VITALS
OXYGEN SATURATION: 99 % | HEART RATE: 63 BPM | TEMPERATURE: 98 F | SYSTOLIC BLOOD PRESSURE: 135 MMHG | WEIGHT: 116 LBS | HEIGHT: 61 IN | BODY MASS INDEX: 21.9 KG/M2 | DIASTOLIC BLOOD PRESSURE: 85 MMHG

## 2024-10-22 DIAGNOSIS — J38.5 LARYNGEAL SPASM: Primary | ICD-10-CM

## 2024-10-22 DIAGNOSIS — I10 PRIMARY HYPERTENSION: ICD-10-CM

## 2024-10-22 DIAGNOSIS — M19.90 OSTEOARTHRITIS, UNSPECIFIED OSTEOARTHRITIS TYPE, UNSPECIFIED SITE: ICD-10-CM

## 2024-10-22 PROCEDURE — 99214 OFFICE O/P EST MOD 30 MIN: CPT | Mod: ,,, | Performed by: STUDENT IN AN ORGANIZED HEALTH CARE EDUCATION/TRAINING PROGRAM

## 2024-10-22 RX ORDER — DENOSUMAB 60 MG/ML
60 INJECTION SUBCUTANEOUS
COMMUNITY

## 2024-10-22 RX ORDER — CELECOXIB 200 MG/1
200 CAPSULE ORAL 2 TIMES DAILY PRN
Qty: 60 CAPSULE | Refills: 1 | Status: SHIPPED | OUTPATIENT
Start: 2024-10-22

## 2024-10-22 NOTE — TELEPHONE ENCOUNTER
Spoke to the patient she explained everything that happened yesterday. She had a spasm episode while talking to someone and eating at the same time. When she woke in the the middle of the night she noticed she could not close her hand into a fist. She stated that today she can close her hand better but wants to make sure she hasn't had anything else go on.  Made an appt for the patient this afternoon to be evaluated. Please advise if she should go to ED or ok to come in. Thank you

## 2024-10-23 RX ORDER — CLONAZEPAM 0.5 MG/1
0.5 TABLET ORAL NIGHTLY
Qty: 30 TABLET | Refills: 0 | Status: SHIPPED | OUTPATIENT
Start: 2024-10-23

## 2024-10-23 NOTE — TELEPHONE ENCOUNTER
----- Message from Lisa sent at 10/23/2024  3:00 PM CDT -----  Who Called: Denise Reece    Refill or New Rx:Refill  RX Name and Strength:clonazePAM (KLONOPIN) 0.5 MG tablet  How is the patient currently taking it? (ex. 1XDay):1x  Is this a 30 day or 90 day RX-30  Local or Mail Order:local  List of preferred pharmacies on file (remove unneeded): [unfilled][facility] [address] [place] [address]      If different Pharmacy is requested, enter Pharmacy information here including location and phone number:    Ordering Provider:sher      Preferred Method of Contact: Phone Call  Patient's Preferred Phone Number on File: [phone]   Best Call Back Number, if different:  Additional Information: pt called for refill on meds need tomm leaving asap pls advise

## 2024-10-24 DIAGNOSIS — E78.5 HYPERLIPIDEMIA, UNSPECIFIED HYPERLIPIDEMIA TYPE: ICD-10-CM

## 2024-10-24 RX ORDER — ROSUVASTATIN CALCIUM 10 MG/1
10 TABLET, COATED ORAL
Qty: 90 TABLET | Refills: 3 | Status: SHIPPED | OUTPATIENT
Start: 2024-10-24

## 2024-11-01 PROBLEM — J38.5 LARYNGEAL SPASM: Status: ACTIVE | Noted: 2024-11-01

## 2024-11-11 DIAGNOSIS — I10 PRIMARY HYPERTENSION: ICD-10-CM

## 2024-11-11 RX ORDER — CLONAZEPAM 0.5 MG/1
0.5 TABLET ORAL NIGHTLY
Qty: 32 TABLET | Refills: 0 | OUTPATIENT
Start: 2024-11-11

## 2024-11-11 NOTE — TELEPHONE ENCOUNTER
Patient stated that she gets #30 of every month but there are occasions that she needs an extra half for the anxiety and she also runs out early when the month has 31 days in it.    She is asking if she can get #32 per month if that's ok with Dr. Tinsley.  Her counselor even suggested that she speak to us about that.

## 2024-11-11 NOTE — TELEPHONE ENCOUNTER
----- Message from H.BLOOM sent at 11/11/2024  8:29 AM CST -----  .Type:  Patient Returning Call    Who Called:pt  Who Left Message for Patient:pt  Does the patient know what this is regarding?:clonazePAM (KLONOPIN) 0.5 MG tablet  Would the patient rather a call back or a response via [facility]?   Best Call Back Number:[phone]   Additional Information: Please call back about clonazePAM (KLONOPIN) 0.5 MG tablet. She need to extra pill due to days in month.

## 2024-11-18 ENCOUNTER — TELEPHONE (OUTPATIENT)
Dept: INTERNAL MEDICINE | Facility: CLINIC | Age: 79
End: 2024-11-18
Payer: MEDICARE

## 2024-11-18 NOTE — TELEPHONE ENCOUNTER
----- Message from Leo sent at 11/18/2024 11:36 AM CST -----  .Type:  Needs Medical Advice    Who Called: Denise  Symptoms (please be specific):    How long has patient had these symptoms:    Pharmacy name and phone #:    Would the patient rather a call back or a response via [facility]?   Best Call Back Number: [phone]  Additional Information: Patient requested call back from someone in the office, re: she needs the last two reports from Dr. Rocha he did an upper and lower GI about four years ago. Please call her back when available. Thanks.

## 2024-11-18 NOTE — TELEPHONE ENCOUNTER
Spoke with Pt, Pt requesting her EGD/Colonoscopy results. I printed the results and they are up front for Pt to . Pt aware.

## 2024-11-18 NOTE — TELEPHONE ENCOUNTER
----- Message from [employer] sent at 11/18/2024 11:45 AM CST -----  Regarding: med advice  Who Called: Denise Reece    Caller is requesting assistance/information from provider's office.    Symptoms (please be specific):    How long has patient had these symptoms:    List of preferred pharmacies on file (remove unneeded): [unfilled]  If different, enter pharmacy into here including location and phone number:       Preferred Method of Contact: Phone Call  Patient's Preferred Phone Number on File: [phone]   Best Call Back Number, if different:  Additional Information: pt is requesting to have paperwork faxed to [phone]. Also has a question about obtaining report from recent urgent care visit

## 2024-11-20 ENCOUNTER — OFFICE VISIT (OUTPATIENT)
Dept: INTERNAL MEDICINE | Facility: CLINIC | Age: 79
End: 2024-11-20
Payer: MEDICARE

## 2024-11-20 VITALS
SYSTOLIC BLOOD PRESSURE: 138 MMHG | OXYGEN SATURATION: 96 % | DIASTOLIC BLOOD PRESSURE: 78 MMHG | WEIGHT: 114 LBS | HEART RATE: 70 BPM | BODY MASS INDEX: 21.52 KG/M2 | HEIGHT: 61 IN

## 2024-11-20 DIAGNOSIS — J45.30 MILD PERSISTENT ASTHMA WITHOUT COMPLICATION: ICD-10-CM

## 2024-11-20 DIAGNOSIS — R05.3 CHRONIC COUGH: ICD-10-CM

## 2024-11-20 DIAGNOSIS — I10 PRIMARY HYPERTENSION: ICD-10-CM

## 2024-11-20 DIAGNOSIS — Z09 HOSPITAL DISCHARGE FOLLOW-UP: Primary | ICD-10-CM

## 2024-11-20 DIAGNOSIS — K21.9 GASTROESOPHAGEAL REFLUX DISEASE WITHOUT ESOPHAGITIS: ICD-10-CM

## 2024-11-20 PROBLEM — J45.31 MILD PERSISTENT ASTHMA WITH ACUTE EXACERBATION: Status: ACTIVE | Noted: 2024-11-20

## 2024-11-20 PROBLEM — K57.90 DIVERTICULOSIS: Status: ACTIVE | Noted: 2024-11-20

## 2024-11-20 PROBLEM — R05.1 ACUTE COUGH: Status: ACTIVE | Noted: 2024-11-20

## 2024-11-20 RX ORDER — LANSOPRAZOLE 30 MG/1
30 CAPSULE, DELAYED RELEASE ORAL DAILY
Qty: 30 CAPSULE | Refills: 11 | Status: SHIPPED | OUTPATIENT
Start: 2024-11-20 | End: 2025-11-20

## 2024-11-20 NOTE — ASSESSMENT & PLAN NOTE
Will trial Prevacid.  Discussed other measures for reflux control.  Notify clinic if symptoms worsen or persist.

## 2024-11-20 NOTE — ASSESSMENT & PLAN NOTE
Start Prevacid 30 mg daily  Avoid spicy, acidic, fried foods and alcohol.  Eat 2-3 hours before going to bed.  Avoid tight clothing, chew food thoroughly.  Reduce caffeine intake, avoid soda.

## 2024-11-20 NOTE — ASSESSMENT & PLAN NOTE
No acute wheezing or shortness of breath.    Continue albuterol and Flovent as previously prescribed.  Followed by pulmonology (Dr Payne)

## 2024-11-20 NOTE — PROGRESS NOTES
Internal Medicine    Patient Name:  Denise Reece   Patient ID: [id number]     Chief Complaint: Follow-up (Urgent care f/u)      HPI:     Denise Reece is a 78 y.o. female, known to Dr Tinsley, is here today for Urgent Care follow up.  Medical comorbidities include HTN, HLD, GERD, anemia, asthma.  Patient was seen in urgent care on 11/09/2024 due to complaints of cough and sore throat.  She was prescribed a Medrol Dosepak for asthma exacerbation.  Presents today to ensure wheezing has resolved.    She also reports complaints of a dry cough times several years.  She was previously told cough was related to reflux.  Currently not on medication but would like to resume Prevacid.  Denies fever, chills, sinus congestion, shortness of breath.    She is also requesting [facility] lab.  Previously had workup was reportedly negative for lupus or rheumatoid arthritis.  She is established with Rheumatology (Dr Viera); will defer testing to him.  Patient states she has an appointment with him next week and will discuss further at that time.    Last AWV: 09/24/2024         Past Medical History:   Diagnosis Date    Allergy     Anemia     Anxiety     GERD (gastroesophageal reflux disease)     Hyperlipidemia     Hypertension     Osteoporosis     Personal history of colonic polyps     Personal history of colonic polyps 04/05/2022        Past Surgical History:   Procedure Laterality Date    BREAST LUMPECTOMY      COLONOSCOPY  04/05/2022    COLONOSCOPY  04/05/2022        Social History     Tobacco Use    Smoking status: Never    Smokeless tobacco: Never   Substance and Sexual Activity    Alcohol use: Never    Drug use: Never    Sexual activity: Not Currently        Current Outpatient Medications   Medication Instructions    albuterol (PROVENTIL/VENTOLIN HFA) 90 mcg/actuation inhaler INHALE 2 PUFFS INTO LUNGS EVERY 6 HOURS AS NEEDED FOR WHEEZING FOR UP  DAYS    ascorbic acid (vitamin C) (VITAMIN C) 500 mg    azelastine  (ASTELIN) 137 mcg (0.1 %) nasal spray 1 spray, 2 times daily    calcium carbonate (OS-JAE) 600 mg    carboxymethylcellulose (REFRESH PLUS) 0.5 % Dpet 1 drop, 3 times daily PRN    celecoxib (CELEBREX) 200 mg, Oral, 2 times daily PRN    clonazePAM (KLONOPIN) 0.5 mg, Oral, Nightly    fluticasone propionate (FLONASE) 50 mcg/actuation nasal spray 1 spray, Daily PRN    fluticasone propionate (FLOVENT HFA) 110 mcg/actuation inhaler 1 puff, Every 12 hours    ketoconazole (NIZORAL) 2 % shampoo Weekly    lansoprazole (PREVACID) 30 mg, Oral, Daily    metoprolol succinate (TOPROL-XL) 25 mg, Nightly    PROLIA 60 mg, Every 6 months    ramipriL (ALTACE) 10 mg, Every morning    rosuvastatin (CRESTOR) 10 mg, Oral       Review of patient's allergies indicates:   Allergen Reactions    Benadryl allergy decongestant     Diphenhydramine hcl         Patient Care Team:  Zhane Alvarez MD as PCP - General (Internal Medicine)  NOHEMI Sheikh MD as Consulting Physician (Gastroenterology)     Subjective:     Review of Systems   Constitutional:  Negative for appetite change, chills, diaphoresis and fever.   HENT:  Negative for congestion, ear pain, sinus pain and sore throat.    Eyes:  Negative for pain and visual disturbance.   Respiratory:  Negative for cough, shortness of breath and wheezing.    Cardiovascular:  Negative for chest pain, palpitations and leg swelling.   Gastrointestinal:  Negative for abdominal pain, constipation, diarrhea, nausea and vomiting.   Endocrine: Negative for cold intolerance.   Genitourinary:  Negative for difficulty urinating, dysuria, frequency and hematuria.   Musculoskeletal:  Negative for arthralgias and myalgias.   Skin:  Negative for color change and rash.   Allergic/Immunologic: Negative.    Neurological: Negative.  Negative for dizziness, syncope, light-headedness and numbness.   Hematological: Negative.    Psychiatric/Behavioral: Negative.  Negative for dysphoric mood. The patient is not  "nervous/anxious.    All other systems reviewed and are negative.      Objective:     Visit Vitals  /78 (BP Location: Left arm, Patient Position: Sitting)   Pulse 70   Ht 5' 1" (1.549 m)   Wt 51.7 kg (114 lb)   SpO2 96%   BMI 21.54 kg/m²       Physical Exam  Vitals and nursing note reviewed.   Constitutional:       General: She is not in acute distress.     Appearance: She is not ill-appearing.   HENT:      Head: Normocephalic and atraumatic.      Nose: No congestion or rhinorrhea.      Mouth/Throat:      Mouth: Mucous membranes are moist.      Pharynx: Oropharynx is clear.   Eyes:      General: No scleral icterus.     Extraocular Movements: Extraocular movements intact.      Conjunctiva/sclera: Conjunctivae normal.      Pupils: Pupils are equal, round, and reactive to light.   Neck:      Vascular: No carotid bruit.   Cardiovascular:      Rate and Rhythm: Normal rate and regular rhythm.      Heart sounds: No murmur heard.     No friction rub. No gallop.   Pulmonary:      Effort: Pulmonary effort is normal. No respiratory distress.      Breath sounds: Normal breath sounds. No wheezing, rhonchi or rales.   Abdominal:      General: Bowel sounds are normal. There is no distension.      Palpations: Abdomen is soft. There is no mass.      Tenderness: There is no abdominal tenderness.   Musculoskeletal:         General: Normal range of motion.      Cervical back: Normal range of motion and neck supple.   Lymphadenopathy:      Cervical: No cervical adenopathy.   Skin:     General: Skin is warm and dry.      Capillary Refill: Capillary refill takes less than 2 seconds.   Neurological:      General: No focal deficit present.      Mental Status: She is alert and oriented to person, place, and time.   Psychiatric:         Mood and Affect: Mood normal.         Behavior: Behavior normal.         Labs Reviewed:     Chemistry:  Lab Results   Component Value Date     08/23/2024    K 4.5 08/23/2024    BUN 25 (A) 08/23/2024 "    CREATININE 0.8 08/23/2024    EGFRNORACEVR 84.2 08/23/2024    GLUCOSE 88 03/21/2024    CALCIUM 8.9 08/23/2024    ALKPHOS 48 03/21/2024    LABPROT 6.7 03/21/2024    ALBUMIN 4.2 08/23/2024    BILIDIR 0.2 02/16/2022    IBILI 0.30 02/16/2022    AST 22 03/21/2024    ALT 6 08/23/2024    MG 2.2 04/22/2019    TSH 2.49 08/23/2024    QHQLBJ5SIPU 1.04 12/07/2017        Hematology:  Lab Results   Component Value Date    WBC 5.27 03/21/2024    HGB 10.3 (L) 03/21/2024    HCT 32.3 (L) 03/21/2024     03/21/2024       Lipid Panel:  Lab Results   Component Value Date    CHOL 161 08/23/2024    HDL 87 08/23/2024    LDL 83.00 03/21/2024    TRIG 45 08/23/2024    TOTALCHOLEST 2 03/21/2024        Urine:  Lab Results   Component Value Date    APPEARANCEUA Cloudy (A) 03/08/2023    SGUA 1.020 03/08/2023    PROTEINUA Negative 03/08/2023    KETONESUA Negative 03/08/2023    LEUKOCYTESUR Negative 03/08/2023    RBCUA <5 03/08/2023    WBCUA <5 03/08/2023    BACTERIA None Seen 03/08/2023        Assessment:       ICD-10-CM ICD-9-CM   1. Hospital discharge follow-up  Z09 V67.59   2. Mild persistent asthma without complication  J45.30 493.90   3. Chronic cough  R05.3 786.2   4. Gastroesophageal reflux disease without esophagitis  K21.9 530.81   5. Primary hypertension  I10 401.9        Plan:     1. Hospital discharge follow-up  Assessment & Plan:  Personally reviewed urgent care records, diagnostic studies, and discharge summary.      2. Mild persistent asthma without complication  Assessment & Plan:  No acute wheezing or shortness of breath.    Continue albuterol and Flovent as previously prescribed.  Followed by pulmonology (Dr Payne)      3. Chronic cough  Assessment & Plan:  Will trial Prevacid.  Discussed other measures for reflux control.  Notify clinic if symptoms worsen or persist.      4. Gastroesophageal reflux disease without esophagitis  Assessment & Plan:  Start Prevacid 30 mg daily  Avoid spicy, acidic, fried foods and  alcohol.  Eat 2-3 hours before going to bed.  Avoid tight clothing, chew food thoroughly.  Reduce caffeine intake, avoid soda.    Orders:  -     lansoprazole (PREVACID) 30 MG capsule; Take 1 capsule (30 mg total) by mouth once daily.  Dispense: 30 capsule; Refill: 11    5. Primary hypertension  Assessment & Plan:  Well-controlled   Continue ramipril 10 mg daily, metoprolol succinate 25 mg nightly  Encouraged low-sodium diet             Follow up for Previously scheduled and PRN if need. In addition to their scheduled follow up, the patient has also been instructed to follow up on as needed basis.       Future Appointments   Date Time Provider Department Center   3/27/2025  2:20 PM Zhane Alvarez MD [facility] [id number]          FAHAD Car

## 2024-11-20 NOTE — ASSESSMENT & PLAN NOTE
Well-controlled   Continue ramipril 10 mg daily, metoprolol succinate 25 mg nightly  Encouraged low-sodium diet

## 2024-11-22 DIAGNOSIS — I10 PRIMARY HYPERTENSION: ICD-10-CM

## 2024-11-22 RX ORDER — CLONAZEPAM 0.5 MG/1
0.5 TABLET ORAL NIGHTLY
Qty: 32 TABLET | Refills: 0 | Status: SHIPPED | OUTPATIENT
Start: 2024-11-22

## 2024-11-22 NOTE — TELEPHONE ENCOUNTER
----- Message from Lisa sent at 11/22/2024  9:18 AM CST -----  Who Called: Denise Reece    Refill or New Rx:Refill  RX Name and Strength:clonazePAM (KLONOPIN) 0.5 MG tablet  How is the patient currently taking it? (ex. 1XDay):1x  Is this a 30 day or 90 day RX:30  Local or Mail Order:local  List of preferred pharmacies on file (remove unneeded): [unfilled][facility] [address] [place] [address]      If different Pharmacy is requested, enter Pharmacy information here including location and phone number:    Ordering Provider:Laureano Gibbons      Preferred Method of Contact: Phone Call  Patient's Preferred Phone Number on File: [phone]   Best Call Back Number, if different:  Additional Information: pt called for med refills and stated agreed to 32 pills instead of 30

## 2024-12-23 DIAGNOSIS — I10 PRIMARY HYPERTENSION: ICD-10-CM

## 2024-12-23 RX ORDER — CLONAZEPAM 0.5 MG/1
0.5 TABLET ORAL NIGHTLY
Qty: 32 TABLET | Refills: 0 | Status: SHIPPED | OUTPATIENT
Start: 2024-12-23

## 2024-12-23 NOTE — TELEPHONE ENCOUNTER
----- Message from Saba sent at 12/23/2024  8:42 AM CST -----  Who Called: Denise Reece    Refill or New Rx:Refill  RX Name and Strength:clonazePAM (KLONOPIN) 0.5 MG tablet   How is the patient currently taking it? (ex. 1XDay):1 xday  Is this a 30 day or 90 day RX:32 tablet  Local or Mail Order:local  List of preferred pharmacies on file (remove unneeded): [unfilled]  If different Pharmacy is requested, enter Pharmacy information here including location and phone number: [facility] [address] [place] [address]   Phone: [phone]  Fax: [phone]         Ordering Provider:sher      Preferred Method of Contact: Phone Call  Patient's Preferred Phone Number on File: [phone]   Best Call Back Number, if different:  Additional Information: refill request, pt called and stated pharmacy needs approval to refill, please advise. thanks

## 2025-01-24 DIAGNOSIS — I10 PRIMARY HYPERTENSION: ICD-10-CM

## 2025-01-24 RX ORDER — CLONAZEPAM 0.5 MG/1
0.5 TABLET ORAL NIGHTLY
Qty: 32 TABLET | Refills: 0 | Status: SHIPPED | OUTPATIENT
Start: 2025-01-24

## 2025-01-24 NOTE — TELEPHONE ENCOUNTER
----- Message from [employer] sent at 1/24/2025 11:35 AM CST -----  Who Called: Denise Reece    Patient is returning phone call    Who Left Message for Patient:  Does the patient know what this is regarding?:      Preferred Method of Contact: Phone Call  Patient's Preferred Phone Number on File: [phone]   Best Call Back Number, if different:  Additional Information: pt called to speak with nurse regarding meds pls advise

## 2025-02-26 DIAGNOSIS — I10 PRIMARY HYPERTENSION: ICD-10-CM

## 2025-02-26 RX ORDER — CLONAZEPAM 0.5 MG/1
0.5 TABLET ORAL NIGHTLY
Qty: 32 TABLET | Refills: 0 | Status: SHIPPED | OUTPATIENT
Start: 2025-02-26

## 2025-03-20 ENCOUNTER — TELEPHONE (OUTPATIENT)
Dept: INTERNAL MEDICINE | Facility: CLINIC | Age: 80
End: 2025-03-20
Payer: MEDICARE

## 2025-03-26 DIAGNOSIS — I10 PRIMARY HYPERTENSION: ICD-10-CM

## 2025-03-27 ENCOUNTER — OFFICE VISIT (OUTPATIENT)
Dept: INTERNAL MEDICINE | Facility: CLINIC | Age: 80
End: 2025-03-27
Payer: MEDICARE

## 2025-03-27 VITALS
BODY MASS INDEX: 22.28 KG/M2 | WEIGHT: 118 LBS | HEIGHT: 61 IN | DIASTOLIC BLOOD PRESSURE: 78 MMHG | OXYGEN SATURATION: 98 % | HEART RATE: 66 BPM | SYSTOLIC BLOOD PRESSURE: 138 MMHG

## 2025-03-27 DIAGNOSIS — F33.1 MAJOR DEPRESSIVE DISORDER, RECURRENT, MODERATE: ICD-10-CM

## 2025-03-27 DIAGNOSIS — K21.9 GASTROESOPHAGEAL REFLUX DISEASE WITHOUT ESOPHAGITIS: Primary | ICD-10-CM

## 2025-03-27 DIAGNOSIS — I10 PRIMARY HYPERTENSION: ICD-10-CM

## 2025-03-27 PROCEDURE — 99214 OFFICE O/P EST MOD 30 MIN: CPT | Mod: ,,, | Performed by: STUDENT IN AN ORGANIZED HEALTH CARE EDUCATION/TRAINING PROGRAM

## 2025-03-27 RX ORDER — CLONAZEPAM 0.5 MG/1
0.5 TABLET ORAL NIGHTLY
Qty: 32 TABLET | Refills: 0 | Status: SHIPPED | OUTPATIENT
Start: 2025-03-27

## 2025-03-27 RX ORDER — FLUTICASONE PROPIONATE 50 MCG
1 SPRAY, SUSPENSION (ML) NASAL DAILY
COMMUNITY

## 2025-03-27 RX ORDER — CELECOXIB 100 MG/1
100 CAPSULE ORAL 2 TIMES DAILY PRN
Qty: 60 CAPSULE | Refills: 1 | Status: SHIPPED | OUTPATIENT
Start: 2025-03-27

## 2025-03-27 RX ORDER — PANTOPRAZOLE SODIUM 20 MG/1
20 TABLET, DELAYED RELEASE ORAL DAILY
Qty: 90 TABLET | Refills: 3 | Status: SHIPPED | OUTPATIENT
Start: 2025-03-27 | End: 2026-03-27

## 2025-03-28 NOTE — PROGRESS NOTES
Subjective:      Denise Reece  03/28/2025  [id number]      Chief Complaint: Follow-up       History of Present Illness    Ms Walker presents today for follow up of multiple concerns including arm pain and cough. She reports intermittent, nagging arm pain that is mild in severity. She visited the emergency room two Saturdays ago due to this arm discomfort. Was prescribed Norco as needed, pain is resolved now. She experienced severe abdominal pain, described as grabbing and causing her to slow down and stop walking. During her last visit with Dr. Sheikh, three diverticular openings were identified, though no immediate concerns were noted. She reports beige or putty-colored stools, lighter than usual, associated with medication use. She experiences dry cough during eating that occurs as food travels down the esophagus. Previous physician attributed symptoms to reflux disorder. She has discontinued Prevacid, prefers to take medication in tablet form. She reports thick toenails due to fungus, which have become too thick for her to cut independently. She is receiving treatment with a topical gel medication for the fungal infection with Podiatry.            Past Medical History:   Diagnosis Date    Allergy     Anemia     Anxiety     GERD (gastroesophageal reflux disease)     Hyperlipidemia     Hypertension     Osteoporosis     Personal history of colonic polyps     Personal history of colonic polyps 04/05/2022     Past Surgical History:   Procedure Laterality Date    BREAST LUMPECTOMY      COLONOSCOPY  04/05/2022    COLONOSCOPY  04/05/2022     No family history on file.  Social History     Tobacco Use    Smoking status: Never    Smokeless tobacco: Never   Substance and Sexual Activity    Alcohol use: Never    Drug use: Never    Sexual activity: Not Currently     Review of patient's allergies indicates:   Allergen Reactions    Benadryl allergy decongestant     Diphenhydramine hcl        The following were  "reviewed at this visit: active problem list, medication list, allergies, family history, social history, and health maintenance.    Medications:  Current Medications[1]      Medications have been reviewed and reconciled with patient at this visit.  Barriers to medications reviewed with patient.    Adverse reactions to current medications reviewed with patient..    Over the counter medications reviewed and reconciled with patient.  Review of Systems   Constitutional:  Negative for chills, fever, malaise/fatigue and weight loss.   HENT:  Negative for congestion, ear discharge, ear pain, hearing loss, sinus pain and sore throat.    Eyes:  Negative for photophobia, pain, discharge and redness.   Respiratory:  Negative for cough, shortness of breath and wheezing.    Cardiovascular:  Negative for chest pain, palpitations and leg swelling.   Gastrointestinal:  Negative for constipation, diarrhea, heartburn, nausea and vomiting.   Genitourinary:  Negative for dysuria, frequency and urgency.   Musculoskeletal:  Negative for falls, joint pain and myalgias.   Skin:  Negative for itching and rash.   Neurological:  Negative for dizziness, focal weakness, weakness and headaches.   Psychiatric/Behavioral:  Negative for depression and memory loss. The patient is not nervous/anxious and does not have insomnia.            Objective:      Vitals:    03/27/25 1424   BP: 138/78   Pulse: 66   SpO2: 98%   Weight: 53.5 kg (118 lb)   Height: 5' 1" (1.549 m)       Physical Exam  Constitutional:       General: She is not in acute distress.     Appearance: Normal appearance.   HENT:      Head: Normocephalic and atraumatic.   Eyes:      Extraocular Movements: Extraocular movements intact.      Pupils: Pupils are equal, round, and reactive to light.   Cardiovascular:      Rate and Rhythm: Normal rate and regular rhythm.      Pulses: Normal pulses.      Heart sounds: Normal heart sounds.   Pulmonary:      Effort: Pulmonary effort is normal.      " Breath sounds: Normal breath sounds.   Abdominal:      General: Abdomen is flat. Bowel sounds are normal. There is no distension.      Palpations: Abdomen is soft.      Tenderness: There is no abdominal tenderness.   Musculoskeletal:      Cervical back: Normal range of motion and neck supple.      Right lower leg: No edema.      Left lower leg: No edema.   Skin:     Findings: No lesion or rash.   Neurological:      General: No focal deficit present.      Mental Status: She is alert and oriented to person, place, and time.               Assessment and Plan:       1. Gastroesophageal reflux disease without esophagitis  Assessment & Plan:  Will switch to pantoprazole due to preference to take medication in tablet form  Suspect cough is likely related to GERD       2. Major depressive disorder, recurrent, moderate  Assessment & Plan:  Stable  Takes clonazepam at night       3. Primary hypertension  Assessment & Plan:  Controlled  Continue ramipril and metoprolol       Other orders  -     pantoprazole (PROTONIX) 20 MG tablet; Take 1 tablet (20 mg total) by mouth once daily.  Dispense: 90 tablet; Refill: 3  -     celecoxib (CELEBREX) 100 MG capsule; Take 1 capsule (100 mg total) by mouth 2 (two) times daily as needed for Pain.  Dispense: 60 capsule; Refill: 1            Follow up: Follow up in about 6 months (around 9/27/2025) for wellness, Labs check.             [1]   Current Outpatient Medications:     albuterol (PROVENTIL/VENTOLIN HFA) 90 mcg/actuation inhaler, INHALE 2 PUFFS INTO LUNGS EVERY 6 HOURS AS NEEDED FOR WHEEZING FOR UP  DAYS, Disp: , Rfl:     ascorbic acid, vitamin C, (VITAMIN C) 500 MG tablet, Take 500 mg by mouth., Disp: , Rfl:     calcium carbonate (OS-JAE) 600 mg calcium (1,500 mg) Tab, Take 600 mg by mouth., Disp: , Rfl:     carboxymethylcellulose (REFRESH PLUS) 0.5 % Dpet, 1 drop 3 (three) times daily as needed., Disp: , Rfl:     clonazePAM (KLONOPIN) 0.5 MG tablet, Take 1 tablet by mouth in the  evening, Disp: 32 tablet, Rfl: 0    denosumab (PROLIA) 60 mg/mL Syrg, Inject 60 mg into the skin every 6 (six) months., Disp: , Rfl:     fluticasone propionate (FLONASE) 50 mcg/actuation nasal spray, 1 spray by Each Nostril route once daily., Disp: , Rfl:     fluticasone propionate (FLOVENT HFA) 110 mcg/actuation inhaler, Inhale 1 puff into the lungs every 12 (twelve) hours., Disp: , Rfl:     ketoconazole (NIZORAL) 2 % shampoo, Apply topically once a week., Disp: , Rfl:     metoprolol succinate (TOPROL-XL) 50 MG 24 hr tablet, Take 50 mg by mouth every evening., Disp: , Rfl:     ramipriL (ALTACE) 10 MG capsule, Take 10 mg by mouth every morning., Disp: , Rfl:     rosuvastatin (CRESTOR) 10 MG tablet, Take 1 tablet by mouth once daily, Disp: 90 tablet, Rfl: 3    azelastine (ASTELIN) 137 mcg (0.1 %) nasal spray, 1 spray by Nasal route 2 (two) times daily. (Patient not taking: Reported on 3/27/2025), Disp: , Rfl:     celecoxib (CELEBREX) 100 MG capsule, Take 1 capsule (100 mg total) by mouth 2 (two) times daily as needed for Pain., Disp: 60 capsule, Rfl: 1    pantoprazole (PROTONIX) 20 MG tablet, Take 1 tablet (20 mg total) by mouth once daily., Disp: 90 tablet, Rfl: 3

## 2025-03-28 NOTE — ASSESSMENT & PLAN NOTE
Will switch to pantoprazole due to preference to take medication in tablet form  Suspect cough is likely related to GERD

## 2025-05-01 DIAGNOSIS — I10 PRIMARY HYPERTENSION: ICD-10-CM

## 2025-05-02 ENCOUNTER — TELEPHONE (OUTPATIENT)
Dept: INTERNAL MEDICINE | Facility: CLINIC | Age: 80
End: 2025-05-02
Payer: MEDICARE

## 2025-05-02 RX ORDER — CLONAZEPAM 0.5 MG/1
0.5 TABLET ORAL NIGHTLY
Qty: 32 TABLET | Refills: 0 | Status: SHIPPED | OUTPATIENT
Start: 2025-05-02

## 2025-05-02 NOTE — TELEPHONE ENCOUNTER
Copied from CRM #[id number]. Topic: Medications - Medication Refill  >> May 2, 2025  9:38 AM Marie wrote:  Who Called: Denise Reece    Refill or New Rx:Refill  RX Name and Strength:  clonazePAM (KLONOPIN) 0.5 MG tablet    How is the patient currently taking it? (ex. 1XDay)  Is this a 30 day or 90 day RX:  Local or Mail Order:  List of preferred pharmacies on file (remove unneeded): [facility] [address] [place] [address]  If different Pharmacy is requested, enter Pharmacy information here including location and phone number:    Ordering Provider:      Preferred Method of Contact: Phone Call  Patient's Preferred Phone Number on File: [phone]   Best Call Back Number, if different:  Additional Information:

## 2025-06-06 DIAGNOSIS — I10 PRIMARY HYPERTENSION: ICD-10-CM

## 2025-06-06 RX ORDER — CLONAZEPAM 0.5 MG/1
0.5 TABLET ORAL NIGHTLY
Qty: 32 TABLET | Refills: 0 | Status: SHIPPED | OUTPATIENT
Start: 2025-06-06

## 2025-07-03 DIAGNOSIS — I10 PRIMARY HYPERTENSION: ICD-10-CM

## 2025-07-03 RX ORDER — CLONAZEPAM 0.5 MG/1
0.5 TABLET ORAL NIGHTLY
Qty: 32 TABLET | Refills: 0 | Status: SHIPPED | OUTPATIENT
Start: 2025-07-03

## 2025-07-03 NOTE — TELEPHONE ENCOUNTER
Copied from CRM #[id number]. Topic: Medications - Medication Refill  >> Jul 3, 2025  8:55 AM Yessenia wrote:  Who Called: Denise Reece    Refill or New Rx:Refill  RX Name and Strength:clonazePAM (KLONOPIN) 0.5 MG tablet  How is the patient currently taking it? (ex. 1XDay):Take 1 tablet by mouth in the evening - Oral  Is this a 30 day or 90 day RX:32  Local or Mail Order:local  List of preferred pharmacies on file (remove unneeded): [facility] [address] [place] [address]  Ordering Provider: Zhane Alvarez MD      Preferred Method of Contact: Phone Call  Patient's Preferred Phone Number on File: [phone]   Best Call Back Number, if different:  Additional Information: Pt stated that she will be out of [place] when she needs a refill on it.

## 2025-08-07 DIAGNOSIS — I10 PRIMARY HYPERTENSION: ICD-10-CM

## 2025-08-07 RX ORDER — CLONAZEPAM 0.5 MG/1
0.5 TABLET ORAL NIGHTLY
Qty: 32 TABLET | Refills: 0 | Status: SHIPPED | OUTPATIENT
Start: 2025-08-07